# Patient Record
Sex: FEMALE | Race: OTHER | NOT HISPANIC OR LATINO
[De-identification: names, ages, dates, MRNs, and addresses within clinical notes are randomized per-mention and may not be internally consistent; named-entity substitution may affect disease eponyms.]

---

## 2017-12-18 ENCOUNTER — APPOINTMENT (OUTPATIENT)
Dept: ORTHOPEDIC SURGERY | Facility: CLINIC | Age: 82
End: 2017-12-18
Payer: MEDICARE

## 2017-12-18 VITALS
BODY MASS INDEX: 37.56 KG/M2 | SYSTOLIC BLOOD PRESSURE: 134 MMHG | WEIGHT: 220 LBS | DIASTOLIC BLOOD PRESSURE: 75 MMHG | HEART RATE: 89 BPM | HEIGHT: 64 IN

## 2017-12-18 DIAGNOSIS — Z78.9 OTHER SPECIFIED HEALTH STATUS: ICD-10-CM

## 2017-12-18 DIAGNOSIS — Z87.891 PERSONAL HISTORY OF NICOTINE DEPENDENCE: ICD-10-CM

## 2017-12-18 DIAGNOSIS — Z87.39 PERSONAL HISTORY OF OTHER DISEASES OF THE MUSCULOSKELETAL SYSTEM AND CONNECTIVE TISSUE: ICD-10-CM

## 2017-12-18 PROCEDURE — 99204 OFFICE O/P NEW MOD 45 MIN: CPT

## 2017-12-18 PROCEDURE — 72110 X-RAY EXAM L-2 SPINE 4/>VWS: CPT

## 2017-12-18 RX ORDER — MIRABEGRON 50 MG/1
TABLET, FILM COATED, EXTENDED RELEASE ORAL
Refills: 0 | Status: ACTIVE | COMMUNITY

## 2019-06-03 ENCOUNTER — APPOINTMENT (OUTPATIENT)
Dept: CARDIOLOGY | Facility: CLINIC | Age: 84
End: 2019-06-03
Payer: MEDICARE

## 2019-06-03 ENCOUNTER — LABORATORY RESULT (OUTPATIENT)
Age: 84
End: 2019-06-03

## 2019-06-03 ENCOUNTER — NON-APPOINTMENT (OUTPATIENT)
Age: 84
End: 2019-06-03

## 2019-06-03 VITALS
DIASTOLIC BLOOD PRESSURE: 60 MMHG | WEIGHT: 227.9 LBS | TEMPERATURE: 98.1 F | HEART RATE: 71 BPM | SYSTOLIC BLOOD PRESSURE: 120 MMHG | BODY MASS INDEX: 38.91 KG/M2 | HEIGHT: 64 IN | OXYGEN SATURATION: 94 %

## 2019-06-03 DIAGNOSIS — I89.0 LYMPHEDEMA, NOT ELSEWHERE CLASSIFIED: ICD-10-CM

## 2019-06-03 PROCEDURE — 99213 OFFICE O/P EST LOW 20 MIN: CPT

## 2019-06-03 PROCEDURE — 93306 TTE W/DOPPLER COMPLETE: CPT

## 2019-06-03 PROCEDURE — 93000 ELECTROCARDIOGRAM COMPLETE: CPT

## 2019-06-03 RX ORDER — DICLOFENAC POTASSIUM 50 MG/1
50 TABLET, COATED ORAL
Qty: 30 | Refills: 0 | Status: ACTIVE | COMMUNITY
Start: 2018-12-28

## 2019-06-03 RX ORDER — MELOXICAM 7.5 MG/1
7.5 TABLET ORAL
Qty: 60 | Refills: 0 | Status: ACTIVE | COMMUNITY
Start: 2019-04-29

## 2019-06-03 RX ORDER — TRAMADOL HYDROCHLORIDE 50 MG/1
50 TABLET, COATED ORAL
Qty: 120 | Refills: 0 | Status: ACTIVE | COMMUNITY
Start: 2018-12-20

## 2019-06-03 RX ORDER — NITROFURANTOIN (MONOHYDRATE/MACROCRYSTALS) 25; 75 MG/1; MG/1
100 CAPSULE ORAL
Qty: 14 | Refills: 0 | Status: DISCONTINUED | COMMUNITY
Start: 2018-12-20 | End: 2019-06-03

## 2019-06-03 NOTE — PHYSICAL EXAM
[No Acute Distress] : no acute distress [Well Nourished] : well nourished [Well Developed] : well developed [Well-Appearing] : well-appearing [Normal Sclera/Conjunctiva] : normal sclera/conjunctiva [EOMI] : extraocular movements intact [PERRL] : pupils equal round and reactive to light [Normal Outer Ear/Nose] : the outer ears and nose were normal in appearance [Normal Oropharynx] : the oropharynx was normal [Supple] : supple [No JVD] : no jugular venous distention [No Lymphadenopathy] : no lymphadenopathy [Thyroid Normal, No Nodules] : the thyroid was normal and there were no nodules present [No Respiratory Distress] : no respiratory distress  [Clear to Auscultation] : lungs were clear to auscultation bilaterally [No Accessory Muscle Use] : no accessory muscle use [Normal Rate] : normal rate  [Regular Rhythm] : with a regular rhythm [Normal S1, S2] : normal S1 and S2 [No Murmur] : no murmur heard [No Carotid Bruits] : no carotid bruits [No Abdominal Bruit] : a ~M bruit was not heard ~T in the abdomen [No Varicosities] : no varicosities [Pedal Pulses Present] : the pedal pulses are present [No Edema] : there was no peripheral edema [No Extremity Clubbing/Cyanosis] : no extremity clubbing/cyanosis [No Palpable Aorta] : no palpable aorta [Soft] : abdomen soft [Non Tender] : non-tender [Non-distended] : non-distended [No Masses] : no abdominal mass palpated [No HSM] : no HSM [Normal Bowel Sounds] : normal bowel sounds [Normal Posterior Cervical Nodes] : no posterior cervical lymphadenopathy [Normal Anterior Cervical Nodes] : no anterior cervical lymphadenopathy [No CVA Tenderness] : no CVA  tenderness [No Spinal Tenderness] : no spinal tenderness [No Joint Swelling] : no joint swelling [Grossly Normal Strength/Tone] : grossly normal strength/tone [No Rash] : no rash [Normal Gait] : normal gait [Coordination Grossly Intact] : coordination grossly intact [No Focal Deficits] : no focal deficits [Deep Tendon Reflexes (DTR)] : deep tendon reflexes were 2+ and symmetric [Normal Affect] : the affect was normal [Normal Insight/Judgement] : insight and judgment were intact [de-identified] : lymphedema bilateral left greater then right

## 2019-06-03 NOTE — HISTORY OF PRESENT ILLNESS
[FreeTextEntry1] : physical  [de-identified] : pt presents for yearly physical pt with hyperlipidemia .pt concerned about lymphedema .pt with ocassional fatigue pt denies any chest  pain dizziness ,lightheadedness ,nausea vomiting diaphoresis\par

## 2019-06-08 LAB
25(OH)D3 SERPL-MCNC: 39.9 NG/ML
ALBUMIN SERPL ELPH-MCNC: 4.8 G/DL
ALP BLD-CCNC: 78 U/L
ALT SERPL-CCNC: 14 U/L
ANION GAP SERPL CALC-SCNC: 10 MMOL/L
APPEARANCE: CLEAR
AST SERPL-CCNC: 16 U/L
BACTERIA: NEGATIVE
BASOPHILS # BLD AUTO: 0.03 K/UL
BASOPHILS NFR BLD AUTO: 0.4 %
BILIRUB SERPL-MCNC: 0.6 MG/DL
BILIRUBIN URINE: NEGATIVE
BLOOD URINE: NEGATIVE
BUN SERPL-MCNC: 32 MG/DL
CALCIUM SERPL-MCNC: 10.5 MG/DL
CHLORIDE SERPL-SCNC: 101 MMOL/L
CHOLEST SERPL-MCNC: 226 MG/DL
CHOLEST/HDLC SERPL: 2.7 RATIO
CK SERPL-CCNC: 45 U/L
CO2 SERPL-SCNC: 29 MMOL/L
COLOR: YELLOW
CREAT SERPL-MCNC: 0.72 MG/DL
EOSINOPHIL # BLD AUTO: 0.16 K/UL
EOSINOPHIL NFR BLD AUTO: 2.4 %
ESTIMATED AVERAGE GLUCOSE: 126 MG/DL
FERRITIN SERPL-MCNC: 47 NG/ML
FOLATE SERPL-MCNC: >20 NG/ML
GLUCOSE QUALITATIVE U: NEGATIVE
GLUCOSE SERPL-MCNC: 113 MG/DL
HAPTOGLOB SERPL-MCNC: 149 MG/DL
HBA1C MFR BLD HPLC: 6 %
HCT VFR BLD CALC: 43.6 %
HDLC SERPL-MCNC: 85 MG/DL
HGB BLD-MCNC: 14 G/DL
HYALINE CASTS: 0 /LPF
IMM GRANULOCYTES NFR BLD AUTO: 0.3 %
IRON SERPL-MCNC: 57 UG/DL
KETONES URINE: NEGATIVE
LDH SERPL-CCNC: 153 U/L
LDLC SERPL CALC-MCNC: 114 MG/DL
LDLC SERPL DIRECT ASSAY-MCNC: 132 MG/DL
LEUKOCYTE ESTERASE URINE: ABNORMAL
LYMPHOCYTES # BLD AUTO: 1.17 K/UL
LYMPHOCYTES NFR BLD AUTO: 17.2 %
MAN DIFF?: NORMAL
MCHC RBC-ENTMCNC: 31.1 PG
MCHC RBC-ENTMCNC: 32.1 GM/DL
MCV RBC AUTO: 96.9 FL
MICROSCOPIC-UA: NORMAL
MONOCYTES # BLD AUTO: 0.5 K/UL
MONOCYTES NFR BLD AUTO: 7.4 %
NEUTROPHILS # BLD AUTO: 4.91 K/UL
NEUTROPHILS NFR BLD AUTO: 72.3 %
NITRITE URINE: NEGATIVE
PH URINE: 6.5
PLATELET # BLD AUTO: 168 K/UL
POTASSIUM SERPL-SCNC: 5.2 MMOL/L
PROT SERPL-MCNC: 7.2 G/DL
PROTEIN URINE: NEGATIVE
RBC # BLD: 4.5 M/UL
RBC # FLD: 13 %
RED BLOOD CELLS URINE: 2 /HPF
SODIUM SERPL-SCNC: 140 MMOL/L
SPECIFIC GRAVITY URINE: 1.02
SQUAMOUS EPITHELIAL CELLS: 2 /HPF
T4 FREE SERPL-MCNC: 1.2 NG/DL
TRANSFERRIN SERPL-MCNC: 243 MG/DL
TRIGL SERPL-MCNC: 133 MG/DL
TSH SERPL-ACNC: 3.63 UIU/ML
UROBILINOGEN URINE: NORMAL
VIT B12 SERPL-MCNC: 454 PG/ML
WBC # FLD AUTO: 6.79 K/UL
WHITE BLOOD CELLS URINE: 1 /HPF

## 2019-06-20 ENCOUNTER — APPOINTMENT (OUTPATIENT)
Dept: VASCULAR SURGERY | Facility: CLINIC | Age: 84
End: 2019-06-20
Payer: MEDICARE

## 2019-06-20 VITALS
HEART RATE: 86 BPM | BODY MASS INDEX: 37.56 KG/M2 | HEIGHT: 64 IN | DIASTOLIC BLOOD PRESSURE: 67 MMHG | TEMPERATURE: 98.8 F | SYSTOLIC BLOOD PRESSURE: 106 MMHG | WEIGHT: 220 LBS

## 2019-06-20 VITALS — HEART RATE: 85 BPM | DIASTOLIC BLOOD PRESSURE: 72 MMHG | SYSTOLIC BLOOD PRESSURE: 108 MMHG

## 2019-06-20 DIAGNOSIS — I83.893 VARICOSE VEINS OF BILATERAL LOWER EXTREMITIES WITH OTHER COMPLICATIONS: ICD-10-CM

## 2019-06-20 PROCEDURE — 99203 OFFICE O/P NEW LOW 30 MIN: CPT

## 2019-06-20 PROCEDURE — 93970 EXTREMITY STUDY: CPT

## 2019-06-21 PROBLEM — I83.893 SYMPTOMATIC VARICOSE VEINS OF BOTH LOWER EXTREMITIES: Status: ACTIVE | Noted: 2019-06-21

## 2019-08-09 ENCOUNTER — APPOINTMENT (OUTPATIENT)
Dept: VASCULAR SURGERY | Facility: CLINIC | Age: 84
End: 2019-08-09

## 2019-08-12 ENCOUNTER — APPOINTMENT (OUTPATIENT)
Dept: VASCULAR SURGERY | Facility: CLINIC | Age: 84
End: 2019-08-12

## 2019-08-23 ENCOUNTER — APPOINTMENT (OUTPATIENT)
Dept: VASCULAR SURGERY | Facility: CLINIC | Age: 84
End: 2019-08-23

## 2019-08-26 ENCOUNTER — APPOINTMENT (OUTPATIENT)
Dept: VASCULAR SURGERY | Facility: CLINIC | Age: 84
End: 2019-08-26

## 2019-09-26 ENCOUNTER — NON-APPOINTMENT (OUTPATIENT)
Age: 84
End: 2019-09-26

## 2019-09-26 ENCOUNTER — APPOINTMENT (OUTPATIENT)
Dept: CARDIOLOGY | Facility: CLINIC | Age: 84
End: 2019-09-26
Payer: MEDICARE

## 2019-09-26 VITALS
SYSTOLIC BLOOD PRESSURE: 120 MMHG | WEIGHT: 228 LBS | TEMPERATURE: 97.6 F | OXYGEN SATURATION: 94 % | DIASTOLIC BLOOD PRESSURE: 60 MMHG | BODY MASS INDEX: 38.93 KG/M2 | HEIGHT: 64 IN | HEART RATE: 83 BPM

## 2019-09-26 DIAGNOSIS — R26.89 OTHER ABNORMALITIES OF GAIT AND MOBILITY: ICD-10-CM

## 2019-09-26 PROCEDURE — 93000 ELECTROCARDIOGRAM COMPLETE: CPT

## 2019-09-26 PROCEDURE — 99214 OFFICE O/P EST MOD 30 MIN: CPT

## 2019-09-26 RX ORDER — TAPENTADOL HYDROCHLORIDE 50 MG/1
50 TABLET, FILM COATED ORAL
Refills: 0 | Status: DISCONTINUED | COMMUNITY
End: 2019-09-26

## 2019-09-26 NOTE — HISTORY OF PRESENT ILLNESS
[FreeTextEntry1] : This is an 84 year old lady with a PMH of HLD, Hypothyroidism, Lymphedema, and Chronic Back Pain presents today for follow up. Patient with generalized joint pain and pain in the left thigh. Patient has been following with pain Management Dr. Sanches, Patient has been following with a Vascular doctor re: Lymphedema (Dr. Hay). Patient states that her mobility has worsened, and her balance has worsened. Patient states that she does not sleep well at night, and sometimes feel dizzy when she wakes up in the AM as she does not sleep well at night. Patient denies dyspnea, palpitations, chest pain, nausea, and vomiting.

## 2019-09-26 NOTE — REVIEW OF SYSTEMS
[Feeling Fatigued] : feeling fatigued [Joint Pain] : joint pain [Lower Back Pain] : lower back pain [Mid Back Pain] : mid back pain [Negative] : Heme/Lymph [Headache] : no headache [Fever] : no fever [Recent Weight Gain (___ Lbs)] : no recent weight gain [Chills] : no chills [Joint Swelling] : no joint swelling [Recent Weight Loss (___ Lbs)] : no recent weight loss [Joint Stiffness] : no joint stiffness [Muscle Cramps] : no muscle cramps [Limb Weakness (Paresis)] : no limb weakness

## 2019-09-26 NOTE — PHYSICAL EXAM
[General Appearance - Well Developed] : well developed [Well Groomed] : well groomed [Normal Appearance] : normal appearance [General Appearance - Well Nourished] : well nourished [No Deformities] : no deformities [General Appearance - In No Acute Distress] : no acute distress [Eyelids - No Xanthelasma] : the eyelids demonstrated no xanthelasmas [Normal Conjunctiva] : the conjunctiva exhibited no abnormalities [Normal Oral Mucosa] : normal oral mucosa [No Oral Pallor] : no oral pallor [No Oral Cyanosis] : no oral cyanosis [Normal Jugular Venous A Waves Present] : normal jugular venous A waves present [Normal Jugular Venous V Waves Present] : normal jugular venous V waves present [No Jugular Venous Smith A Waves] : no jugular venous smith A waves [Respiration, Rhythm And Depth] : normal respiratory rhythm and effort [Auscultation Breath Sounds / Voice Sounds] : lungs were clear to auscultation bilaterally [Exaggerated Use Of Accessory Muscles For Inspiration] : no accessory muscle use [Heart Sounds] : normal S1 and S2 [Heart Rate And Rhythm] : heart rate and rhythm were normal [Abdomen Soft] : soft [Murmurs] : no murmurs present [Abdomen Tenderness] : non-tender [Abdomen Mass (___ Cm)] : no abdominal mass palpated [Abnormal Walk] : normal gait [Gait - Sufficient For Exercise Testing] : the gait was sufficient for exercise testing [Nail Clubbing] : no clubbing of the fingernails [Cyanosis, Localized] : no localized cyanosis [Petechial Hemorrhages (___cm)] : no petechial hemorrhages [] : no rash [No Venous Stasis] : no venous stasis [Skin Color & Pigmentation] : normal skin color and pigmentation [Skin Lesions] : no skin lesions [No Xanthoma] : no  xanthoma was observed [No Skin Ulcers] : no skin ulcer [Oriented To Time, Place, And Person] : oriented to person, place, and time [Affect] : the affect was normal [Mood] : the mood was normal [No Anxiety] : not feeling anxious [FreeTextEntry1] : Lymphedema noted

## 2019-09-26 NOTE — REASON FOR VISIT
[Follow-Up - Clinic] : a clinic follow-up of [Hyperlipidemia] : hyperlipidemia [Hypertension] : hypertension [FreeTextEntry1] : Lymphedema, Chronic Back Pain

## 2019-09-28 LAB
25(OH)D3 SERPL-MCNC: 43.8 NG/ML
ALBUMIN SERPL ELPH-MCNC: 4.8 G/DL
ALP BLD-CCNC: 90 U/L
ALT SERPL-CCNC: 17 U/L
ANION GAP SERPL CALC-SCNC: 18 MMOL/L
APPEARANCE: CLEAR
AST SERPL-CCNC: 17 U/L
BACTERIA: NEGATIVE
BASOPHILS # BLD AUTO: 0.02 K/UL
BASOPHILS NFR BLD AUTO: 0.3 %
BILIRUB DIRECT SERPL-MCNC: 0.2 MG/DL
BILIRUB INDIRECT SERPL-MCNC: 0.7 MG/DL
BILIRUB SERPL-MCNC: 0.9 MG/DL
BILIRUBIN URINE: NEGATIVE
BLOOD URINE: NEGATIVE
BUN SERPL-MCNC: 27 MG/DL
CALCIUM SERPL-MCNC: 10.7 MG/DL
CHLORIDE SERPL-SCNC: 99 MMOL/L
CHOLEST SERPL-MCNC: 209 MG/DL
CHOLEST/HDLC SERPL: 2.3 RATIO
CO2 SERPL-SCNC: 22 MMOL/L
COLOR: YELLOW
CREAT SERPL-MCNC: 0.7 MG/DL
EOSINOPHIL # BLD AUTO: 0.11 K/UL
EOSINOPHIL NFR BLD AUTO: 1.6 %
ESTIMATED AVERAGE GLUCOSE: 126 MG/DL
GLUCOSE QUALITATIVE U: NEGATIVE
GLUCOSE SERPL-MCNC: 113 MG/DL
HBA1C MFR BLD HPLC: 6 %
HCT VFR BLD CALC: 46 %
HDLC SERPL-MCNC: 93 MG/DL
HGB BLD-MCNC: 14.3 G/DL
HYALINE CASTS: 3 /LPF
IMM GRANULOCYTES NFR BLD AUTO: 0.3 %
KETONES URINE: NEGATIVE
LDLC SERPL CALC-MCNC: 82 MG/DL
LEUKOCYTE ESTERASE URINE: NEGATIVE
LYMPHOCYTES # BLD AUTO: 1.14 K/UL
LYMPHOCYTES NFR BLD AUTO: 17 %
MAN DIFF?: NORMAL
MCHC RBC-ENTMCNC: 30.4 PG
MCHC RBC-ENTMCNC: 31.1 GM/DL
MCV RBC AUTO: 97.7 FL
MICROSCOPIC-UA: NORMAL
MONOCYTES # BLD AUTO: 0.45 K/UL
MONOCYTES NFR BLD AUTO: 6.7 %
NEUTROPHILS # BLD AUTO: 4.98 K/UL
NEUTROPHILS NFR BLD AUTO: 74.1 %
NITRITE URINE: NEGATIVE
PH URINE: 7
PLATELET # BLD AUTO: 182 K/UL
POTASSIUM SERPL-SCNC: 4.9 MMOL/L
PROT SERPL-MCNC: 7.2 G/DL
PROTEIN URINE: NORMAL
RBC # BLD: 4.71 M/UL
RBC # FLD: 13.2 %
RED BLOOD CELLS URINE: 2 /HPF
SODIUM SERPL-SCNC: 139 MMOL/L
SPECIFIC GRAVITY URINE: 1.02
SQUAMOUS EPITHELIAL CELLS: 2 /HPF
T4 FREE SERPL-MCNC: 1.2 NG/DL
TRIGL SERPL-MCNC: 172 MG/DL
TSH SERPL-ACNC: 3.64 UIU/ML
UROBILINOGEN URINE: NORMAL
WBC # FLD AUTO: 6.72 K/UL
WHITE BLOOD CELLS URINE: 1 /HPF

## 2019-10-04 ENCOUNTER — APPOINTMENT (OUTPATIENT)
Dept: VASCULAR SURGERY | Facility: CLINIC | Age: 84
End: 2019-10-04

## 2019-10-10 ENCOUNTER — MEDICATION RENEWAL (OUTPATIENT)
Age: 84
End: 2019-10-10

## 2019-10-13 LAB
ANION GAP SERPL CALC-SCNC: 13 MMOL/L
BUN SERPL-MCNC: 27 MG/DL
CALCIUM SERPL-MCNC: 10.1 MG/DL
CHLORIDE SERPL-SCNC: 101 MMOL/L
CO2 SERPL-SCNC: 24 MMOL/L
CREAT SERPL-MCNC: 0.82 MG/DL
GLUCOSE SERPL-MCNC: 119 MG/DL
POTASSIUM SERPL-SCNC: 5.5 MMOL/L
SODIUM SERPL-SCNC: 138 MMOL/L

## 2019-10-14 DIAGNOSIS — E87.5 HYPERKALEMIA: ICD-10-CM

## 2019-10-15 PROBLEM — E87.5 HYPERKALEMIA: Status: ACTIVE | Noted: 2019-10-15

## 2019-10-21 LAB
ALBUMIN MFR SERPL ELPH: 61.8 %
ALBUMIN SERPL-MCNC: 4.2 G/DL
ALBUMIN/GLOB SERPL: 1.6 RATIO
ALPHA1 GLOB MFR SERPL ELPH: 4 %
ALPHA1 GLOB SERPL ELPH-MCNC: 0.3 G/DL
ALPHA2 GLOB MFR SERPL ELPH: 10.6 %
ALPHA2 GLOB SERPL ELPH-MCNC: 0.7 G/DL
ANION GAP SERPL CALC-SCNC: 14 MMOL/L
B-GLOBULIN MFR SERPL ELPH: 10.7 %
B-GLOBULIN SERPL ELPH-MCNC: 0.7 G/DL
BUN SERPL-MCNC: 34 MG/DL
CALCIUM SERPL-MCNC: 10.6 MG/DL
CALCIUM SERPL-MCNC: 10.6 MG/DL
CHLORIDE SERPL-SCNC: 100 MMOL/L
CO2 SERPL-SCNC: 25 MMOL/L
CREAT SERPL-MCNC: 0.67 MG/DL
GAMMA GLOB FLD ELPH-MCNC: 0.9 G/DL
GAMMA GLOB MFR SERPL ELPH: 12.9 %
GLUCOSE SERPL-MCNC: 102 MG/DL
INTERPRETATION SERPL IEP-IMP: NORMAL
M PROTEIN SPEC IFE-MCNC: NORMAL
PARATHYROID HORMONE INTACT: 40 PG/ML
POTASSIUM SERPL-SCNC: 5.1 MMOL/L
PROT SERPL-MCNC: 6.8 G/DL
PROT SERPL-MCNC: 6.8 G/DL
SODIUM SERPL-SCNC: 139 MMOL/L

## 2019-10-28 ENCOUNTER — APPOINTMENT (OUTPATIENT)
Dept: ENDOCRINOLOGY | Facility: CLINIC | Age: 84
End: 2019-10-28
Payer: MEDICARE

## 2019-10-28 VITALS
OXYGEN SATURATION: 95 % | SYSTOLIC BLOOD PRESSURE: 118 MMHG | WEIGHT: 228 LBS | BODY MASS INDEX: 38.93 KG/M2 | HEIGHT: 64 IN | DIASTOLIC BLOOD PRESSURE: 80 MMHG | HEART RATE: 83 BPM

## 2019-10-28 DIAGNOSIS — R53.83 OTHER FATIGUE: ICD-10-CM

## 2019-10-28 PROCEDURE — 36415 COLL VENOUS BLD VENIPUNCTURE: CPT

## 2019-10-28 PROCEDURE — 99204 OFFICE O/P NEW MOD 45 MIN: CPT | Mod: 25

## 2019-10-28 RX ORDER — MULTIVIT-MIN/FOLIC/VIT K/LYCOP 400-300MCG
25 MCG TABLET ORAL DAILY
Qty: 90 | Refills: 0 | Status: ACTIVE | COMMUNITY
Start: 2019-10-28

## 2019-10-28 NOTE — HISTORY OF PRESENT ILLNESS
[FreeTextEntry1] : Ms. SURESH NGUYEN   is a 84 year  year old  female present today courtesy po PCP dr Avila with regard to a history of hypothyroidism.   She is too here for recent  elevated levels  calcium of 10.6 mg/dl, done by PCP Dr Avila . Patient is currently taking Levothyroxine 88  mcg daily.  Hypothyroidism has been present over 20 years.  Patient's daughter has history of thyroid disease.  She  has been compliant in taking the LT4 daily, away from food or any medication that may inhibit absorption. She  has tolerated this medication well. Without any apparent adverse effects. She denies any temperature intolerance, significant weight changes. She  in addition denies any palpitations, tremors, anxiousness, change in bowel habits or significant change in moods. Patient has lymphedema  to LE due to three spinal surgery and insomnia due to  comorbidities. \par Additional medical history includes gerd along with hyperlipidemia  and Hypercalcemia.  \par \par Patient had  nuclear implant in back for pain  which caused loss of bladder control.  Nuclear implant was later removed. Patient self catheterize 4x/day. Has loss weight on Optavia , loss 50 lbs over several months.  Patient has gained several pounds . Patient has tingling and numbness  from back to left thigh. \par rt Tibia fracture in 1984  due to fall on snow and fracture tibia and fibula. Required surery  Too, 2014 ldft ankle fx when fell out of wheel chair\par \par She does state that she was on fosomax for a number of years but ahs too been off for years but caused reflux.

## 2019-11-13 LAB
24R-OH-CALCIDIOL SERPL-MCNC: 37.4 PG/ML
ACE BLD-CCNC: 52 U/L
CALCIUM ?TM UR-MCNC: 18.7 MG/DL
CALCIUM SERPL-MCNC: 10.2 MG/DL
CALCIUM/CREAT UR: 0.3 RATIO
CREAT SPEC-SCNC: 56 MG/DL
PARATHYROID HORMONE INTACT: 42 PG/ML
PTH RELATED PROT SERPL-MCNC: <2 PMOL/L

## 2020-01-27 ENCOUNTER — NON-APPOINTMENT (OUTPATIENT)
Age: 85
End: 2020-01-27

## 2020-01-27 ENCOUNTER — APPOINTMENT (OUTPATIENT)
Dept: CARDIOLOGY | Facility: CLINIC | Age: 85
End: 2020-01-27
Payer: MEDICARE

## 2020-01-27 VITALS
BODY MASS INDEX: 39.14 KG/M2 | WEIGHT: 228 LBS | TEMPERATURE: 97.8 F | OXYGEN SATURATION: 95 % | HEART RATE: 78 BPM | DIASTOLIC BLOOD PRESSURE: 80 MMHG | SYSTOLIC BLOOD PRESSURE: 150 MMHG

## 2020-01-27 DIAGNOSIS — R42 DIZZINESS AND GIDDINESS: ICD-10-CM

## 2020-01-27 PROCEDURE — 93000 ELECTROCARDIOGRAM COMPLETE: CPT

## 2020-01-27 PROCEDURE — 99214 OFFICE O/P EST MOD 30 MIN: CPT

## 2020-01-27 NOTE — PHYSICAL EXAM
[General Appearance - Well Developed] : well developed [Normal Appearance] : normal appearance [Well Groomed] : well groomed [General Appearance - Well Nourished] : well nourished [No Deformities] : no deformities [General Appearance - In No Acute Distress] : no acute distress [Normal Conjunctiva] : the conjunctiva exhibited no abnormalities [Eyelids - No Xanthelasma] : the eyelids demonstrated no xanthelasmas [Normal Oral Mucosa] : normal oral mucosa [No Oral Pallor] : no oral pallor [No Oral Cyanosis] : no oral cyanosis [Normal Jugular Venous A Waves Present] : normal jugular venous A waves present [Normal Jugular Venous V Waves Present] : normal jugular venous V waves present [No Jugular Venous Smith A Waves] : no jugular venous smith A waves [Respiration, Rhythm And Depth] : normal respiratory rhythm and effort [Exaggerated Use Of Accessory Muscles For Inspiration] : no accessory muscle use [Auscultation Breath Sounds / Voice Sounds] : lungs were clear to auscultation bilaterally [Heart Rate And Rhythm] : heart rate and rhythm were normal [Heart Sounds] : normal S1 and S2 [Murmurs] : no murmurs present [Abdomen Soft] : soft [Abdomen Tenderness] : non-tender [Abdomen Mass (___ Cm)] : no abdominal mass palpated [Abnormal Walk] : normal gait [Gait - Sufficient For Exercise Testing] : the gait was sufficient for exercise testing [Nail Clubbing] : no clubbing of the fingernails [Cyanosis, Localized] : no localized cyanosis [Petechial Hemorrhages (___cm)] : no petechial hemorrhages [Skin Color & Pigmentation] : normal skin color and pigmentation [] : no rash [No Venous Stasis] : no venous stasis [Skin Lesions] : no skin lesions [No Skin Ulcers] : no skin ulcer [No Xanthoma] : no  xanthoma was observed [Oriented To Time, Place, And Person] : oriented to person, place, and time [Affect] : the affect was normal [Mood] : the mood was normal [No Anxiety] : not feeling anxious

## 2020-01-27 NOTE — HISTORY OF PRESENT ILLNESS
[FreeTextEntry1] : Shahrzad is an 85yo female who presents for routine follow-up. Patient has PMH of HLD, HTN, and hypothyroidism. Patient reports that she has been experiencing lightheadedness for the past few days. patient reports the lightheadedness occurs even at rest. Patient denies chest pain, shortness of breath or palpitations. Pt also with lymphedema of lower extremities.

## 2020-01-30 ENCOUNTER — NON-APPOINTMENT (OUTPATIENT)
Age: 85
End: 2020-01-30

## 2020-02-03 PROCEDURE — 93224 XTRNL ECG REC UP TO 48 HRS: CPT

## 2020-02-24 ENCOUNTER — APPOINTMENT (OUTPATIENT)
Dept: ENDOCRINOLOGY | Facility: CLINIC | Age: 85
End: 2020-02-24
Payer: MEDICARE

## 2020-02-24 ENCOUNTER — APPOINTMENT (OUTPATIENT)
Dept: CARDIOLOGY | Facility: CLINIC | Age: 85
End: 2020-02-24
Payer: MEDICARE

## 2020-02-24 VITALS
WEIGHT: 228 LBS | BODY MASS INDEX: 38.93 KG/M2 | DIASTOLIC BLOOD PRESSURE: 70 MMHG | OXYGEN SATURATION: 94 % | HEART RATE: 84 BPM | SYSTOLIC BLOOD PRESSURE: 130 MMHG | HEIGHT: 64 IN

## 2020-02-24 VITALS
TEMPERATURE: 97.8 F | SYSTOLIC BLOOD PRESSURE: 130 MMHG | HEART RATE: 81 BPM | DIASTOLIC BLOOD PRESSURE: 60 MMHG | HEIGHT: 64 IN | OXYGEN SATURATION: 97 % | BODY MASS INDEX: 38.93 KG/M2 | WEIGHT: 228 LBS

## 2020-02-24 DIAGNOSIS — R42 DIZZINESS AND GIDDINESS: ICD-10-CM

## 2020-02-24 PROCEDURE — 99214 OFFICE O/P EST MOD 30 MIN: CPT

## 2020-02-24 PROCEDURE — 99213 OFFICE O/P EST LOW 20 MIN: CPT

## 2020-02-24 PROCEDURE — 96372 THER/PROPH/DIAG INJ SC/IM: CPT

## 2020-02-24 NOTE — PHYSICAL EXAM
[General Appearance - Well Developed] : well developed [Normal Appearance] : normal appearance [Well Groomed] : well groomed [No Deformities] : no deformities [General Appearance - Well Nourished] : well nourished [General Appearance - In No Acute Distress] : no acute distress [Eyelids - No Xanthelasma] : the eyelids demonstrated no xanthelasmas [Normal Conjunctiva] : the conjunctiva exhibited no abnormalities [Normal Oral Mucosa] : normal oral mucosa [No Oral Pallor] : no oral pallor [No Oral Cyanosis] : no oral cyanosis [Normal Jugular Venous V Waves Present] : normal jugular venous V waves present [Normal Jugular Venous A Waves Present] : normal jugular venous A waves present [No Jugular Venous Smith A Waves] : no jugular venous smith A waves [Respiration, Rhythm And Depth] : normal respiratory rhythm and effort [Exaggerated Use Of Accessory Muscles For Inspiration] : no accessory muscle use [Auscultation Breath Sounds / Voice Sounds] : lungs were clear to auscultation bilaterally [Heart Rate And Rhythm] : heart rate and rhythm were normal [Heart Sounds] : normal S1 and S2 [Murmurs] : no murmurs present [Abdomen Soft] : soft [Abdomen Tenderness] : non-tender [Abdomen Mass (___ Cm)] : no abdominal mass palpated [Nail Clubbing] : no clubbing of the fingernails [Petechial Hemorrhages (___cm)] : no petechial hemorrhages [Cyanosis, Localized] : no localized cyanosis [Skin Color & Pigmentation] : normal skin color and pigmentation [] : no rash [Skin Lesions] : no skin lesions [No Skin Ulcers] : no skin ulcer [No Venous Stasis] : no venous stasis [No Xanthoma] : no  xanthoma was observed [Affect] : the affect was normal [Oriented To Time, Place, And Person] : oriented to person, place, and time [No Anxiety] : not feeling anxious [Mood] : the mood was normal [FreeTextEntry1] : reproducible pain left back area

## 2020-02-24 NOTE — HISTORY OF PRESENT ILLNESS
[FreeTextEntry1] : pt presents for f/u .pt with no furthur dizziness .pt with persistent back pain and leg pain seeing specialist pt denies any chest  pain dizziness ,lightheadedness ,nausea vomiting diaphoresis\par

## 2020-03-15 LAB
25(OH)D3 SERPL-MCNC: 53.6 NG/ML
ALBUMIN SERPL ELPH-MCNC: 5 G/DL
ALP BLD-CCNC: 82 U/L
ALT SERPL-CCNC: 17 U/L
ANION GAP SERPL CALC-SCNC: 14 MMOL/L
APPEARANCE: CLEAR
AST SERPL-CCNC: 19 U/L
BACTERIA: NEGATIVE
BASOPHILS # BLD AUTO: 0.03 K/UL
BASOPHILS NFR BLD AUTO: 0.4 %
BILIRUB DIRECT SERPL-MCNC: 0.2 MG/DL
BILIRUB INDIRECT SERPL-MCNC: 0.6 MG/DL
BILIRUB SERPL-MCNC: 0.8 MG/DL
BILIRUBIN URINE: NEGATIVE
BLOOD URINE: NEGATIVE
BUN SERPL-MCNC: 31 MG/DL
CALCIUM SERPL-MCNC: 10.2 MG/DL
CHLORIDE SERPL-SCNC: 100 MMOL/L
CHOLEST SERPL-MCNC: 224 MG/DL
CHOLEST/HDLC SERPL: 2.3 RATIO
CK SERPL-CCNC: 43 U/L
CO2 SERPL-SCNC: 25 MMOL/L
COLOR: NORMAL
CREAT SERPL-MCNC: 0.68 MG/DL
EOSINOPHIL # BLD AUTO: 0.1 K/UL
EOSINOPHIL NFR BLD AUTO: 1.5 %
ESTIMATED AVERAGE GLUCOSE: 123 MG/DL
GLUCOSE QUALITATIVE U: NEGATIVE
GLUCOSE SERPL-MCNC: 130 MG/DL
HBA1C MFR BLD HPLC: 5.9 %
HCT VFR BLD CALC: 45.7 %
HDLC SERPL-MCNC: 98 MG/DL
HGB BLD-MCNC: 14.3 G/DL
HYALINE CASTS: 0 /LPF
IMM GRANULOCYTES NFR BLD AUTO: 0.3 %
KETONES URINE: NEGATIVE
LDLC SERPL CALC-MCNC: 98 MG/DL
LDLC SERPL DIRECT ASSAY-MCNC: 108 MG/DL
LEUKOCYTE ESTERASE URINE: NEGATIVE
LYMPHOCYTES # BLD AUTO: 1.2 K/UL
LYMPHOCYTES NFR BLD AUTO: 18 %
MAN DIFF?: NORMAL
MCHC RBC-ENTMCNC: 30.9 PG
MCHC RBC-ENTMCNC: 31.3 GM/DL
MCV RBC AUTO: 98.7 FL
MICROSCOPIC-UA: NORMAL
MONOCYTES # BLD AUTO: 0.49 K/UL
MONOCYTES NFR BLD AUTO: 7.3 %
NEUTROPHILS # BLD AUTO: 4.84 K/UL
NEUTROPHILS NFR BLD AUTO: 72.5 %
NITRITE URINE: NEGATIVE
PH URINE: 7.5
PLATELET # BLD AUTO: 176 K/UL
POTASSIUM SERPL-SCNC: 5.2 MMOL/L
PROT SERPL-MCNC: 7.2 G/DL
PROTEIN URINE: NEGATIVE
RBC # BLD: 4.63 M/UL
RBC # FLD: 14 %
RED BLOOD CELLS URINE: 1 /HPF
SODIUM SERPL-SCNC: 139 MMOL/L
SPECIFIC GRAVITY URINE: 1.02
SQUAMOUS EPITHELIAL CELLS: 1 /HPF
T4 FREE SERPL-MCNC: 1.1 NG/DL
TRIGL SERPL-MCNC: 142 MG/DL
TSH SERPL-ACNC: 3.51 UIU/ML
UROBILINOGEN URINE: NORMAL
WBC # FLD AUTO: 6.68 K/UL
WHITE BLOOD CELLS URINE: 0 /HPF

## 2020-05-09 ENCOUNTER — RX RENEWAL (OUTPATIENT)
Age: 85
End: 2020-05-09

## 2020-06-05 ENCOUNTER — NON-APPOINTMENT (OUTPATIENT)
Age: 85
End: 2020-06-05

## 2020-06-05 ENCOUNTER — LABORATORY RESULT (OUTPATIENT)
Age: 85
End: 2020-06-05

## 2020-06-05 ENCOUNTER — APPOINTMENT (OUTPATIENT)
Dept: PULMONOLOGY | Facility: CLINIC | Age: 85
End: 2020-06-05
Payer: MEDICARE

## 2020-06-05 ENCOUNTER — APPOINTMENT (OUTPATIENT)
Dept: CARDIOLOGY | Facility: CLINIC | Age: 85
End: 2020-06-05
Payer: MEDICARE

## 2020-06-05 VITALS
SYSTOLIC BLOOD PRESSURE: 120 MMHG | HEIGHT: 64 IN | DIASTOLIC BLOOD PRESSURE: 70 MMHG | OXYGEN SATURATION: 97 % | HEART RATE: 85 BPM | TEMPERATURE: 98.6 F

## 2020-06-05 PROCEDURE — 71046 X-RAY EXAM CHEST 2 VIEWS: CPT

## 2020-06-05 PROCEDURE — 99214 OFFICE O/P EST MOD 30 MIN: CPT

## 2020-06-05 PROCEDURE — 93000 ELECTROCARDIOGRAM COMPLETE: CPT

## 2020-06-05 NOTE — HISTORY OF PRESENT ILLNESS
[FreeTextEntry1] : Pt. is a 85 y.o female with a PMHx of HLD, hypothyroid, lumbar radiculopathy, lymphedema s/p spinal sx, osteoarthritis, vitamin d deficiency, here for f/u. Pt. reports that she still has back pain, occuring from posterior thoracic region to lumbar, did not try gabapentin because she said doesn't agree with her. Tried APAP, Tramadol, taking medical THC with minimal effect. Sees Dr. Ramon Sanches [phone] [facility] for pain management. Pt. also c/o of SOB, thru out the day, occurs during activity and laying down, needs to stop activity, unable to walking 2 blocks. Still has SOB when at home w/o wearing mask, no anxiety, no hx of asthma, copd, smoking. Denies chest pain, palpitations, PND, syncope, claudication, fever, chills, anorexia, nausea, vomiting, diarrhea, constipation, acid reflux and any recent infection. Pt. request IgG COVID19 testing.\par

## 2020-06-05 NOTE — PHYSICAL EXAM
[General Appearance - Well Developed] : well developed [Normal Appearance] : normal appearance [Well Groomed] : well groomed [General Appearance - Well Nourished] : well nourished [No Deformities] : no deformities [General Appearance - In No Acute Distress] : no acute distress [Normal Conjunctiva] : the conjunctiva exhibited no abnormalities [Eyelids - No Xanthelasma] : the eyelids demonstrated no xanthelasmas [Normal Oral Mucosa] : normal oral mucosa [Normal Jugular Venous A Waves Present] : normal jugular venous A waves present [No Oral Pallor] : no oral pallor [No Oral Cyanosis] : no oral cyanosis [Normal Jugular Venous V Waves Present] : normal jugular venous V waves present [No Jugular Venous Smith A Waves] : no jugular venous smith A waves [Respiration, Rhythm And Depth] : normal respiratory rhythm and effort [Auscultation Breath Sounds / Voice Sounds] : lungs were clear to auscultation bilaterally [Exaggerated Use Of Accessory Muscles For Inspiration] : no accessory muscle use [Heart Rate And Rhythm] : heart rate and rhythm were normal [Murmurs] : no murmurs present [Heart Sounds] : normal S1 and S2 [Abdomen Tenderness] : non-tender [Abdomen Soft] : soft [FreeTextEntry1] : reproducible pain left back area  [Nail Clubbing] : no clubbing of the fingernails [Abdomen Mass (___ Cm)] : no abdominal mass palpated [Cyanosis, Localized] : no localized cyanosis [Petechial Hemorrhages (___cm)] : no petechial hemorrhages [Skin Color & Pigmentation] : normal skin color and pigmentation [] : no rash [No Venous Stasis] : no venous stasis [Skin Lesions] : no skin lesions [No Skin Ulcers] : no skin ulcer [No Xanthoma] : no  xanthoma was observed [Oriented To Time, Place, And Person] : oriented to person, place, and time [Mood] : the mood was normal [Affect] : the affect was normal [No Anxiety] : not feeling anxious

## 2020-06-08 ENCOUNTER — APPOINTMENT (OUTPATIENT)
Dept: CARDIOLOGY | Facility: CLINIC | Age: 85
End: 2020-06-08
Payer: MEDICARE

## 2020-06-08 PROCEDURE — 78452 HT MUSCLE IMAGE SPECT MULT: CPT

## 2020-06-08 PROCEDURE — A9500: CPT

## 2020-06-08 PROCEDURE — 93306 TTE W/DOPPLER COMPLETE: CPT

## 2020-06-08 PROCEDURE — 93970 EXTREMITY STUDY: CPT

## 2020-06-08 PROCEDURE — 93015 CV STRESS TEST SUPVJ I&R: CPT

## 2020-06-08 RX ORDER — REGADENOSON 0.08 MG/ML
0.4 INJECTION, SOLUTION INTRAVENOUS
Qty: 1 | Refills: 0 | Status: COMPLETED | OUTPATIENT
Start: 2020-06-08

## 2020-06-08 RX ADMIN — REGADENOSON 0 MG/5ML: 0.08 INJECTION, SOLUTION INTRAVENOUS at 00:00

## 2020-06-14 LAB
25(OH)D3 SERPL-MCNC: 51.3 NG/ML
ALBUMIN SERPL ELPH-MCNC: 4.4 G/DL
ALP BLD-CCNC: 67 U/L
ALT SERPL-CCNC: 19 U/L
ANION GAP SERPL CALC-SCNC: 13 MMOL/L
APPEARANCE: CLEAR
AST SERPL-CCNC: 19 U/L
BACTERIA: ABNORMAL
BASOPHILS # BLD AUTO: 0.03 K/UL
BASOPHILS NFR BLD AUTO: 0.4 %
BILIRUB SERPL-MCNC: 0.9 MG/DL
BILIRUBIN URINE: NEGATIVE
BLOOD URINE: NEGATIVE
BUN SERPL-MCNC: 26 MG/DL
CALCIUM SERPL-MCNC: 10.5 MG/DL
CHLORIDE SERPL-SCNC: 101 MMOL/L
CHOLEST SERPL-MCNC: 257 MG/DL
CHOLEST/HDLC SERPL: 3 RATIO
CK SERPL-CCNC: 47 U/L
CO2 SERPL-SCNC: 24 MMOL/L
COLOR: YELLOW
CREAT SERPL-MCNC: 0.73 MG/DL
CREAT SPEC-SCNC: 61 MG/DL
EOSINOPHIL # BLD AUTO: 0.1 K/UL
EOSINOPHIL NFR BLD AUTO: 1.4 %
ESTIMATED AVERAGE GLUCOSE: 128 MG/DL
GLUCOSE QUALITATIVE U: NEGATIVE
GLUCOSE SERPL-MCNC: 111 MG/DL
HBA1C MFR BLD HPLC: 6.1 %
HCT VFR BLD CALC: 45 %
HDLC SERPL-MCNC: 85 MG/DL
HGB BLD-MCNC: 14.1 G/DL
HYALINE CASTS: 1 /LPF
IMM GRANULOCYTES NFR BLD AUTO: 0.3 %
KETONES URINE: NEGATIVE
LDLC SERPL CALC-MCNC: 129 MG/DL
LEUKOCYTE ESTERASE URINE: ABNORMAL
LYMPHOCYTES # BLD AUTO: 1.09 K/UL
LYMPHOCYTES NFR BLD AUTO: 14.9 %
MAN DIFF?: NORMAL
MCHC RBC-ENTMCNC: 30.3 PG
MCHC RBC-ENTMCNC: 31.3 GM/DL
MCV RBC AUTO: 96.6 FL
MICROALBUMIN 24H UR DL<=1MG/L-MCNC: 2.8 MG/DL
MICROALBUMIN/CREAT 24H UR-RTO: 46 MG/G
MICROSCOPIC-UA: NORMAL
MONOCYTES # BLD AUTO: 0.5 K/UL
MONOCYTES NFR BLD AUTO: 6.8 %
NEUTROPHILS # BLD AUTO: 5.56 K/UL
NEUTROPHILS NFR BLD AUTO: 76.2 %
NITRITE URINE: NEGATIVE
NT-PROBNP SERPL-MCNC: 68 PG/ML
PH URINE: 7
PLATELET # BLD AUTO: 176 K/UL
POTASSIUM SERPL-SCNC: 5.3 MMOL/L
PROT SERPL-MCNC: 6.9 G/DL
PROTEIN URINE: NORMAL
RBC # BLD: 4.66 M/UL
RBC # FLD: 14.5 %
RED BLOOD CELLS URINE: 4 /HPF
SARS-COV-2 IGG SERPL IA-ACNC: <0.1 INDEX
SARS-COV-2 IGG SERPL QL IA: NEGATIVE
SODIUM SERPL-SCNC: 139 MMOL/L
SPECIFIC GRAVITY URINE: 1.02
SQUAMOUS EPITHELIAL CELLS: 1 /HPF
T4 FREE SERPL-MCNC: 1.1 NG/DL
TRIGL SERPL-MCNC: 214 MG/DL
TSH SERPL-ACNC: 2.1 UIU/ML
UROBILINOGEN URINE: NORMAL
WBC # FLD AUTO: 7.3 K/UL
WHITE BLOOD CELLS URINE: 58 /HPF

## 2020-06-15 DIAGNOSIS — Z78.9 OTHER SPECIFIED HEALTH STATUS: ICD-10-CM

## 2020-06-17 ENCOUNTER — APPOINTMENT (OUTPATIENT)
Dept: PULMONOLOGY | Facility: CLINIC | Age: 85
End: 2020-06-17
Payer: MEDICARE

## 2020-06-17 VITALS
HEIGHT: 64 IN | SYSTOLIC BLOOD PRESSURE: 123 MMHG | HEART RATE: 81 BPM | DIASTOLIC BLOOD PRESSURE: 73 MMHG | TEMPERATURE: 98.6 F | WEIGHT: 228 LBS | BODY MASS INDEX: 38.93 KG/M2 | OXYGEN SATURATION: 96 %

## 2020-06-17 DIAGNOSIS — Z20.828 CONTACT WITH AND (SUSPECTED) EXPOSURE TO OTHER VIRAL COMMUNICABLE DISEASES: ICD-10-CM

## 2020-06-17 PROCEDURE — 99205 OFFICE O/P NEW HI 60 MIN: CPT | Mod: CS

## 2020-06-17 NOTE — DISCUSSION/SUMMARY
[FreeTextEntry1] : Shortness of breath-dyspnea on exertion\par Rule out due to overweight with a component of deconditioning\par Former tobacco history-rule out COPD, emphysema\par History obstructive sleep apnea-has declined use of CPAP\par Rule out subtle interstitial lung disease\par Schedule chest CT scan noncontrast\par Order PFT body box if demonstrates obstructive pattern, COPD will address issue of bronchodilators lama agents inhaled steroids\par Office follow-up\par

## 2020-06-17 NOTE — PHYSICAL EXAM
[No Acute Distress] : no acute distress [Low Lying Soft Palate] : low lying soft palate [Normal Appearance] : normal appearance [III] : Mallampati Class: III [Supple] : supple [No JVD] : no jvd [Normal S1, S2] : normal s1, s2 [Normal Rate/Rhythm] : normal rate/rhythm [No Murmurs] : no murmurs [No Acc Muscle Use] : no acc muscle use [No Resp Distress] : no resp distress [Normal Rhythm and Effort] : normal rhythm and effort [Normal Palpation] : normal palpation [Clear to Auscultation Bilaterally] : clear to auscultation bilaterally [Normal to Percussion] : normal to percussion [Soft] : soft [Normal Bowel Sounds] : normal bowel sounds [No HSM] : no hsm [No Clubbing] : no clubbing [No Cyanosis] : no cyanosis [No Edema] : no edema [Normal Color/ Pigmentation] : normal color/ pigmentation [No Rash] : no rash [Oriented x3] : oriented x3 [Normal Mood] : normal mood [No Focal Deficits] : no focal deficits [Normal Affect] : normal affect [TextBox_2] : overweight

## 2020-06-17 NOTE — PROCEDURE
[FreeTextEntry1] : Chest x-ray completed June 5, 2020\par Clear lungs\par Kumar kyra in place\par Right costophrenic angle with blunting\par \par June 8, 2020\par Nuclear stress test negative\par Echocardiogram\par EF approximately 55 to 60%\par Diastolic dysfunction\par Mild LVH\par \par Additional data review COVID antibody negative\par

## 2020-06-17 NOTE — HISTORY OF PRESENT ILLNESS
[Former] : former [>= 30 pack years] : >= 30 pack years [TextBox_4] : Pulmonary evaluation\par Chief complaint progressive shortness of breath dyspnea on exertion\par Patient describes her shortness of breath is heavy breathing fatigue and feeling tired\par She does not describe associated wheeze willing to walk steps and stairs but that she states because of her lumbar back pain\par Prior pulmonary history.  Denies history of asthma pneumonia myocarditis pulmonary bowel disease tuberculosis\par Positive obstructive sleep apnea some years ago data not available did not tolerate CPAP\par No history of chemical toxic inhalational exposures\par Approximately 46-year pack history tobacco but discontinued smoking greater than 30 years\par  [TextBox_11] : 2 [TextBox_29] : 23-year pack history age of 18 through age 51 approximately 2 packs/day [TextBox_13] : 1986

## 2020-06-17 NOTE — REVIEW OF SYSTEMS
[Fatigue] : fatigue [Hay Fever] : hay fever [Frequency] : frequency [Memory Loss] : memory loss [Negative] : Neurologic [Fever] : no fever [Recent Wt Gain (___ Lbs)] : ~T no recent weight gain [Chills] : no chills [Poor Appetite] : no poor appetite [Chest Discomfort] : no chest discomfort [Orthopnea] : no orthopnea [Phlebitis] : no phlebitis [PND] : no PND [Syncope] : no syncope [GERD] : no gerd [Abdominal Pain] : no abdominal pain [Nausea] : no nausea [Diarrhea] : no diarrhea [Vomiting] : no vomiting [Dysphagia] : no dysphagia [Food Intolerance] : no food intolerance [Hepatic Disease] : no hepatic disease [Headache] : no headache [Focal Weakness] : no focal weakness [Seizures] : no seizures [Head Injury] : no head injury [Dizziness] : no dizziness [Numbness] : no numbness [Diabetes] : no diabetes [Thyroid Problem] : no thyroid problem [TextBox_30] : HPI [TextBox_44] : Chronic lymphedema [TextBox_94] : Lumbar radiculopathy with balance problems

## 2020-06-18 LAB
APPEARANCE: CLEAR
BACTERIA: ABNORMAL
BILIRUBIN URINE: NEGATIVE
BLOOD URINE: NEGATIVE
COLOR: NORMAL
GLUCOSE QUALITATIVE U: NEGATIVE
HYALINE CASTS: 2 /LPF
KETONES URINE: NEGATIVE
LEUKOCYTE ESTERASE URINE: ABNORMAL
MICROSCOPIC-UA: NORMAL
NITRITE URINE: POSITIVE
PH URINE: 7
PROTEIN URINE: NEGATIVE
RED BLOOD CELLS URINE: 1 /HPF
SPECIFIC GRAVITY URINE: 1.02
SQUAMOUS EPITHELIAL CELLS: 4 /HPF
UROBILINOGEN URINE: NORMAL
WHITE BLOOD CELLS URINE: 9 /HPF

## 2020-06-19 ENCOUNTER — APPOINTMENT (OUTPATIENT)
Dept: CARDIOLOGY | Facility: CLINIC | Age: 85
End: 2020-06-19
Payer: MEDICARE

## 2020-06-19 VITALS
HEIGHT: 64 IN | OXYGEN SATURATION: 96 % | TEMPERATURE: 98.7 F | HEART RATE: 77 BPM | WEIGHT: 228 LBS | BODY MASS INDEX: 38.93 KG/M2 | SYSTOLIC BLOOD PRESSURE: 126 MMHG | DIASTOLIC BLOOD PRESSURE: 60 MMHG

## 2020-06-19 DIAGNOSIS — G62.9 POLYNEUROPATHY, UNSPECIFIED: ICD-10-CM

## 2020-06-19 DIAGNOSIS — R82.81 PYURIA: ICD-10-CM

## 2020-06-19 PROCEDURE — 99213 OFFICE O/P EST LOW 20 MIN: CPT

## 2020-06-19 RX ORDER — DULOXETINE HYDROCHLORIDE 60 MG/1
60 CAPSULE, DELAYED RELEASE PELLETS ORAL
Qty: 30 | Refills: 0 | Status: DISCONTINUED | COMMUNITY
Start: 2018-11-21 | End: 2020-06-19

## 2020-06-19 NOTE — PHYSICAL EXAM
[Normal Appearance] : normal appearance [General Appearance - Well Developed] : well developed [Well Groomed] : well groomed [No Deformities] : no deformities [General Appearance - In No Acute Distress] : no acute distress [General Appearance - Well Nourished] : well nourished [Eyelids - No Xanthelasma] : the eyelids demonstrated no xanthelasmas [Normal Conjunctiva] : the conjunctiva exhibited no abnormalities [No Oral Pallor] : no oral pallor [Normal Oral Mucosa] : normal oral mucosa [No Oral Cyanosis] : no oral cyanosis [Normal Jugular Venous A Waves Present] : normal jugular venous A waves present [Normal Jugular Venous V Waves Present] : normal jugular venous V waves present [No Jugular Venous Smith A Waves] : no jugular venous smith A waves [Respiration, Rhythm And Depth] : normal respiratory rhythm and effort [Auscultation Breath Sounds / Voice Sounds] : lungs were clear to auscultation bilaterally [Exaggerated Use Of Accessory Muscles For Inspiration] : no accessory muscle use [Heart Rate And Rhythm] : heart rate and rhythm were normal [Heart Sounds] : normal S1 and S2 [Abdomen Tenderness] : non-tender [Murmurs] : no murmurs present [Abdomen Soft] : soft [Abdomen Mass (___ Cm)] : no abdominal mass palpated [Gait - Sufficient For Exercise Testing] : the gait was sufficient for exercise testing [Abnormal Walk] : normal gait [Petechial Hemorrhages (___cm)] : no petechial hemorrhages [Nail Clubbing] : no clubbing of the fingernails [Cyanosis, Localized] : no localized cyanosis [Skin Color & Pigmentation] : normal skin color and pigmentation [] : no rash [No Venous Stasis] : no venous stasis [No Skin Ulcers] : no skin ulcer [Skin Lesions] : no skin lesions [Oriented To Time, Place, And Person] : oriented to person, place, and time [No Xanthoma] : no  xanthoma was observed [Mood] : the mood was normal [Affect] : the affect was normal [No Anxiety] : not feeling anxious

## 2020-06-19 NOTE — HISTORY OF PRESENT ILLNESS
[FreeTextEntry1] : Pt. is a 85 y.o female with a PMHx of HLD, hypothyroid, lumbar radiculopathy, lymphedema s/p spinal sx, osteoarthritis, vitamin d deficiency, here for f/u. Patient was previously seen in office on  6/5 and ordered for a series of tests for work-up of dyspnea. She also followed up with Dr. Mary who ordered patient for CT chest but has not completed yet. Patient is here to review results of cardiac testing. Patient was also found to have UTI on recent u/a and culture today. Patient reports that she has noticed some improvement in her urinary symptoms.

## 2020-06-21 ENCOUNTER — OUTPATIENT (OUTPATIENT)
Dept: OUTPATIENT SERVICES | Facility: HOSPITAL | Age: 85
LOS: 1 days | End: 2020-06-21
Payer: MEDICARE

## 2020-06-21 ENCOUNTER — RESULT REVIEW (OUTPATIENT)
Age: 85
End: 2020-06-21

## 2020-06-21 ENCOUNTER — APPOINTMENT (OUTPATIENT)
Dept: CT IMAGING | Facility: IMAGING CENTER | Age: 85
End: 2020-06-21

## 2020-06-21 DIAGNOSIS — R06.02 SHORTNESS OF BREATH: ICD-10-CM

## 2020-06-21 LAB — BACTERIA UR CULT: ABNORMAL

## 2020-06-21 PROCEDURE — 71250 CT THORAX DX C-: CPT | Mod: 26

## 2020-06-21 PROCEDURE — 71250 CT THORAX DX C-: CPT

## 2020-06-25 DIAGNOSIS — Z01.818 ENCOUNTER FOR OTHER PREPROCEDURAL EXAMINATION: ICD-10-CM

## 2020-06-26 ENCOUNTER — APPOINTMENT (OUTPATIENT)
Dept: DISASTER EMERGENCY | Facility: CLINIC | Age: 85
End: 2020-06-26

## 2020-06-27 ENCOUNTER — APPOINTMENT (OUTPATIENT)
Dept: DISASTER EMERGENCY | Facility: CLINIC | Age: 85
End: 2020-06-27

## 2020-06-27 LAB — SARS-COV-2 N GENE NPH QL NAA+PROBE: NOT DETECTED

## 2020-06-29 ENCOUNTER — APPOINTMENT (OUTPATIENT)
Dept: PULMONOLOGY | Facility: CLINIC | Age: 85
End: 2020-06-29
Payer: MEDICARE

## 2020-06-29 VITALS
HEIGHT: 64 IN | WEIGHT: 225 LBS | SYSTOLIC BLOOD PRESSURE: 118 MMHG | HEART RATE: 91 BPM | DIASTOLIC BLOOD PRESSURE: 77 MMHG | TEMPERATURE: 98.7 F | RESPIRATION RATE: 16 BRPM | OXYGEN SATURATION: 95 % | BODY MASS INDEX: 38.41 KG/M2

## 2020-06-29 PROCEDURE — 94618 PULMONARY STRESS TESTING: CPT

## 2020-06-29 PROCEDURE — 94750: CPT

## 2020-06-29 PROCEDURE — 94729 DIFFUSING CAPACITY: CPT

## 2020-06-29 PROCEDURE — 94664 DEMO&/EVAL PT USE INHALER: CPT | Mod: 59

## 2020-06-29 PROCEDURE — 94726 PLETHYSMOGRAPHY LUNG VOLUMES: CPT

## 2020-06-29 PROCEDURE — 94060 EVALUATION OF WHEEZING: CPT

## 2020-06-29 PROCEDURE — 99214 OFFICE O/P EST MOD 30 MIN: CPT | Mod: 25

## 2020-06-29 RX ORDER — TRAMADOL HYDROCHLORIDE AND ACETAMINOPHEN 37.5; 325 MG/1; MG/1
37.5-325 TABLET, FILM COATED ORAL
Qty: 63 | Refills: 0 | Status: ACTIVE | COMMUNITY
Start: 2020-05-21

## 2020-06-29 NOTE — PHYSICAL EXAM
[No Acute Distress] : no acute distress [Low Lying Soft Palate] : low lying soft palate [III] : Mallampati Class: III [No JVD] : no jvd [Supple] : supple [Normal Appearance] : normal appearance [Normal S1, S2] : normal s1, s2 [Normal Rate/Rhythm] : normal rate/rhythm [No Resp Distress] : no resp distress [No Murmurs] : no murmurs [No Acc Muscle Use] : no acc muscle use [Normal Palpation] : normal palpation [Normal Rhythm and Effort] : normal rhythm and effort [Clear to Auscultation Bilaterally] : clear to auscultation bilaterally [Soft] : soft [Normal to Percussion] : normal to percussion [No Clubbing] : no clubbing [Normal Bowel Sounds] : normal bowel sounds [No HSM] : no hsm [No Edema] : no edema [No Cyanosis] : no cyanosis [Normal Color/ Pigmentation] : normal color/ pigmentation [No Rash] : no rash [No Focal Deficits] : no focal deficits [Normal Mood] : normal mood [Oriented x3] : oriented x3 [Normal Affect] : normal affect [TextBox_2] : overweight

## 2020-06-29 NOTE — REVIEW OF SYSTEMS
[Fatigue] : fatigue [Hay Fever] : hay fever [Frequency] : frequency [Memory Loss] : memory loss [Negative] : Neurologic [Recent Wt Gain (___ Lbs)] : ~T no recent weight gain [Chills] : no chills [Fever] : no fever [Orthopnea] : no orthopnea [Chest Discomfort] : no chest discomfort [Poor Appetite] : no poor appetite [PND] : no PND [Phlebitis] : no phlebitis [Syncope] : no syncope [GERD] : no gerd [Abdominal Pain] : no abdominal pain [Vomiting] : no vomiting [Diarrhea] : no diarrhea [Nausea] : no nausea [Dysphagia] : no dysphagia [Food Intolerance] : no food intolerance [Hepatic Disease] : no hepatic disease [Focal Weakness] : no focal weakness [Headache] : no headache [Seizures] : no seizures [Head Injury] : no head injury [Numbness] : no numbness [Dizziness] : no dizziness [Thyroid Problem] : no thyroid problem [Diabetes] : no diabetes [TextBox_30] : HPI [TextBox_94] : Lumbar radiculopathy with balance problems [TextBox_44] : Chronic lymphedema

## 2020-06-29 NOTE — PROCEDURE
[FreeTextEntry1] : PFT June 29, 2020\par Normal spirometry\par No bronchodilator response at FEV1\par Normal lung volumes with total lung capacity 105% predicted.\par Resistance and specific conductance normal\par Diffusion normal 82% of predicted.\par Hemoglobin 14.1\par \par CT chest June 21, 2020\par 2 mm left upper lobe calcified granuloma\par Negative pleural effusions\par Hilum and mediastinum normal without adenopathy\par Atherosclerotic changes aorta coronary arteries\par No pericardial effusion\par Clear lungs\par No evidence of pneumonia\par \par Pulmonary 6-minute walk June 29, 2020\par Normal study\par Baseline room air O2 saturations 95 to 96%\par No desaturation on room air\par \par Chest x-ray completed June 5, 2020\par Clear lungs\par Kumar kyra in place\par Right costophrenic angle with blunting\par \par June 8, 2020\par Nuclear stress test negative\par Echocardiogram\par EF approximately 55 to 60%\par Diastolic dysfunction\par Mild LVH\par \par Additional data review COVID antibody negative\par \par

## 2020-06-29 NOTE — HISTORY OF PRESENT ILLNESS
[Former] : former [>= 30 pack years] : >= 30 pack years [TextBox_4] : Pulmonary evaluation\par Chief complaint progressive shortness of breath dyspnea on exertion\par Patient describes her shortness of breath is heavy breathing fatigue and feeling tired\par She does not describe associated wheeze willing to walk steps and stairs but that she states because of her lumbar back pain\par Prior pulmonary history.  Denies history of asthma pneumonia myocarditis pulmonary bowel disease tuberculosis\par Positive obstructive sleep apnea some years ago data not available did not tolerate CPAP\par No history of chemical toxic inhalational exposures\par Approximately 46-year pack history tobacco but discontinued smoking greater than 30 years\par  [TextBox_11] : 2 [TextBox_13] : 1986 [TextBox_29] : 23-year pack history age of 18 through age 51 approximately 2 packs/day

## 2020-07-23 ENCOUNTER — LABORATORY RESULT (OUTPATIENT)
Age: 85
End: 2020-07-23

## 2020-07-26 LAB
APPEARANCE: CLEAR
BACTERIA UR CULT: ABNORMAL
BILIRUBIN URINE: NEGATIVE
BLOOD URINE: NEGATIVE
COLOR: NORMAL
GLUCOSE QUALITATIVE U: NEGATIVE
KETONES URINE: NEGATIVE
LEUKOCYTE ESTERASE URINE: ABNORMAL
NITRITE URINE: POSITIVE
PH URINE: 6.5
PROTEIN URINE: NEGATIVE
SPECIFIC GRAVITY URINE: 1.01
UROBILINOGEN URINE: NORMAL

## 2020-07-26 RX ORDER — CEFDINIR 300 MG/1
300 CAPSULE ORAL TWICE DAILY
Qty: 14 | Refills: 0 | Status: ACTIVE | COMMUNITY
Start: 2020-06-18 | End: 1900-01-01

## 2020-07-31 ENCOUNTER — APPOINTMENT (OUTPATIENT)
Dept: CARDIOLOGY | Facility: CLINIC | Age: 85
End: 2020-07-31
Payer: MEDICARE

## 2020-07-31 VITALS
SYSTOLIC BLOOD PRESSURE: 130 MMHG | DIASTOLIC BLOOD PRESSURE: 70 MMHG | HEIGHT: 64 IN | HEART RATE: 84 BPM | OXYGEN SATURATION: 94 %

## 2020-07-31 DIAGNOSIS — R41.3 OTHER AMNESIA: ICD-10-CM

## 2020-07-31 PROCEDURE — 99213 OFFICE O/P EST LOW 20 MIN: CPT

## 2020-07-31 NOTE — PHYSICAL EXAM
[General Appearance - Well Developed] : well developed [Well Groomed] : well groomed [General Appearance - Well Nourished] : well nourished [Normal Appearance] : normal appearance [General Appearance - In No Acute Distress] : no acute distress [No Deformities] : no deformities [Normal Conjunctiva] : the conjunctiva exhibited no abnormalities [Eyelids - No Xanthelasma] : the eyelids demonstrated no xanthelasmas [Normal Oral Mucosa] : normal oral mucosa [No Oral Pallor] : no oral pallor [No Oral Cyanosis] : no oral cyanosis [Normal Jugular Venous A Waves Present] : normal jugular venous A waves present [Normal Jugular Venous V Waves Present] : normal jugular venous V waves present [No Jugular Venous Smith A Waves] : no jugular venous smith A waves [Exaggerated Use Of Accessory Muscles For Inspiration] : no accessory muscle use [Respiration, Rhythm And Depth] : normal respiratory rhythm and effort [Auscultation Breath Sounds / Voice Sounds] : lungs were clear to auscultation bilaterally [Heart Rate And Rhythm] : heart rate and rhythm were normal [Heart Sounds] : normal S1 and S2 [Murmurs] : no murmurs present [Abdomen Tenderness] : non-tender [Abdomen Soft] : soft [Abdomen Mass (___ Cm)] : no abdominal mass palpated [Gait - Sufficient For Exercise Testing] : the gait was sufficient for exercise testing [Abnormal Walk] : normal gait [Nail Clubbing] : no clubbing of the fingernails [Cyanosis, Localized] : no localized cyanosis [Petechial Hemorrhages (___cm)] : no petechial hemorrhages [] : no rash [Skin Color & Pigmentation] : normal skin color and pigmentation [Skin Lesions] : no skin lesions [No Venous Stasis] : no venous stasis [No Xanthoma] : no  xanthoma was observed [No Skin Ulcers] : no skin ulcer [Oriented To Time, Place, And Person] : oriented to person, place, and time [Affect] : the affect was normal [No Anxiety] : not feeling anxious [Mood] : the mood was normal

## 2020-07-31 NOTE — HISTORY OF PRESENT ILLNESS
[FreeTextEntry1] : Pt. is a 85 y.o female with a PMHx of HLD, hypothyroid, lumbar radiculopathy, lymphedema s/p spinal sx, osteoarthritis, vitamin d deficiency, here for f/u. Patient was previously seen in office for dyspnea, she had cardiac w/u done which was negative, she had CT chest done last month which was wnl. Pt reports her dyspnea is intermittent, unchanged in the last few months. She also reports memory loss, states she has difficulty remembering people's names and short term things. She has no other additional issues or concerns for today. pt with improved dysuria

## 2020-08-29 LAB
APPEARANCE: CLEAR
BACTERIA: NEGATIVE
BILIRUBIN URINE: NEGATIVE
BLOOD URINE: NEGATIVE
COLOR: YELLOW
GLUCOSE QUALITATIVE U: NEGATIVE
HYALINE CASTS: 2 /LPF
KETONES URINE: NEGATIVE
LEUKOCYTE ESTERASE URINE: NEGATIVE
MICROSCOPIC-UA: NORMAL
NITRITE URINE: NEGATIVE
PH URINE: 6.5
PROTEIN URINE: NORMAL
RED BLOOD CELLS URINE: 2 /HPF
SPECIFIC GRAVITY URINE: 1.03
SQUAMOUS EPITHELIAL CELLS: 7 /HPF
UROBILINOGEN URINE: NORMAL
WHITE BLOOD CELLS URINE: 3 /HPF

## 2020-08-31 NOTE — HISTORY OF PRESENT ILLNESS
[FreeTextEntry1] : Ms. SURESH NGUYEN   is a 84 year  year old female mili returns for endocrine  follow up  with regard to a history of hypothyroidism.   She too returns with regard to mild hypercalcemia Patient is currently taking Levothyroxine 88  mcg daily.  Hypothyroidism has been present over 20 years.  .  She  has been compliant in taking the LT4 daily, away from food or any medication that may inhibit absorption. She  has tolerated this medication well. Without any apparent adverse effects. She denies any temperature intolerance, significant weight changes. She  in addition denies any palpitations, tremors, anxiousness, change in bowel habits or significant change in moods. Patient has lymphedema  to LE due to three spinal surgery and insomnia due to  comorbidities. \par Additional medical history includes gerd along with hyperlipidemia   Is oN d3 1,000 iu daily for vitamin d deficiency.\par On med marijuana capsules per Dr. Sanches.\par \par Hx tibnial fx  Lowest t scvodsre -2.5   risk faor fall\par \par willige PRolia

## 2020-09-01 ENCOUNTER — APPOINTMENT (OUTPATIENT)
Dept: PULMONOLOGY | Facility: CLINIC | Age: 85
End: 2020-09-01

## 2020-09-17 ENCOUNTER — APPOINTMENT (OUTPATIENT)
Dept: ENDOCRINOLOGY | Facility: CLINIC | Age: 85
End: 2020-09-17
Payer: MEDICARE

## 2020-09-17 VITALS
BODY MASS INDEX: 37.22 KG/M2 | HEART RATE: 85 BPM | HEIGHT: 64 IN | OXYGEN SATURATION: 96 % | SYSTOLIC BLOOD PRESSURE: 118 MMHG | DIASTOLIC BLOOD PRESSURE: 82 MMHG | WEIGHT: 218 LBS | RESPIRATION RATE: 16 BRPM

## 2020-09-17 PROCEDURE — 96372 THER/PROPH/DIAG INJ SC/IM: CPT

## 2020-09-17 RX ORDER — DENOSUMAB 60 MG/ML
60 INJECTION SUBCUTANEOUS
Qty: 1 | Refills: 0 | Status: COMPLETED | OUTPATIENT
Start: 2020-09-17

## 2020-09-17 RX ORDER — LEVOTHYROXINE SODIUM 0.09 MG/1
88 TABLET ORAL DAILY
Qty: 90 | Refills: 1 | Status: ACTIVE | COMMUNITY
Start: 2018-10-11 | End: 1900-01-01

## 2020-09-17 RX ADMIN — DENOSUMAB 0 MG/ML: 60 INJECTION SUBCUTANEOUS at 00:00

## 2020-09-17 NOTE — HISTORY OF PRESENT ILLNESS
[FreeTextEntry1] : Ms. SURESH NGUYEN   is a 85 year  year old female mili returns for endocrine  follow up  with regard to a history of hypothyroidism.   She too returns with regard to mild hypercalcemia Patient is currently taking Levothyroxine 88  mcg daily.  Hypothyroidism has been present over 20 years. She  has tolerated this medication well. Without any apparent adverse effects. She denies any temperature intolerance, significant weight changes. She  in addition denies any palpitations, tremors, anxiousness, change in bowel habits or significant change in moods. Patient has lymphedema  to LE due to three spinal surgery and insomnia due to  comorbidities. \par Additional medical history includes gerd along with hyperlipidemia   Is  taking  d3 1,000 iu daily for vitamin d deficiency.\par On med marijuana capsules per Dr. Sanches. Does help with pain left  thigh\par Hx tibnial fx  Lowest t scvodsre -2.5   risk faor fall Dex Nov 2019\par tingling rt habnd ? cts\par \par willige PRolia

## 2020-11-07 ENCOUNTER — RX RENEWAL (OUTPATIENT)
Age: 85
End: 2020-11-07

## 2020-12-01 ENCOUNTER — NON-APPOINTMENT (OUTPATIENT)
Age: 85
End: 2020-12-01

## 2020-12-01 ENCOUNTER — APPOINTMENT (OUTPATIENT)
Dept: CARDIOLOGY | Facility: CLINIC | Age: 85
End: 2020-12-01
Payer: MEDICARE

## 2020-12-01 VITALS
TEMPERATURE: 98.1 F | HEART RATE: 86 BPM | DIASTOLIC BLOOD PRESSURE: 70 MMHG | HEIGHT: 64 IN | OXYGEN SATURATION: 97 % | SYSTOLIC BLOOD PRESSURE: 146 MMHG

## 2020-12-01 DIAGNOSIS — N39.0 URINARY TRACT INFECTION, SITE NOT SPECIFIED: ICD-10-CM

## 2020-12-01 DIAGNOSIS — R06.02 SHORTNESS OF BREATH: ICD-10-CM

## 2020-12-01 DIAGNOSIS — Z71.89 OTHER SPECIFIED COUNSELING: ICD-10-CM

## 2020-12-01 DIAGNOSIS — M25.552 PAIN IN LEFT HIP: ICD-10-CM

## 2020-12-01 DIAGNOSIS — R45.89 OTHER SYMPTOMS AND SIGNS INVOLVING EMOTIONAL STATE: ICD-10-CM

## 2020-12-01 PROCEDURE — 99214 OFFICE O/P EST MOD 30 MIN: CPT

## 2020-12-01 PROCEDURE — 93000 ELECTROCARDIOGRAM COMPLETE: CPT

## 2020-12-01 NOTE — HISTORY OF PRESENT ILLNESS
[FreeTextEntry1] : This is a 85 year old lady presents today to follow up. Patient states that she was recently seen by the urologist- Dr. Zaragoza and was prescribed Keflex. Patient has baseline SOB, which has been worked up previously.  Patient with chronic hip pain, she states she has been told in the past that it is due to her previous spinal surgeries. Patient denies dyspnea, palpitations, chest pain, nausea, vomiting, dizziness and lightheadedness.\par

## 2020-12-01 NOTE — PHYSICAL EXAM
[General Appearance - Well Developed] : well developed [Normal Appearance] : normal appearance [Well Groomed] : well groomed [General Appearance - Well Nourished] : well nourished [No Deformities] : no deformities [General Appearance - In No Acute Distress] : no acute distress [Normal Conjunctiva] : the conjunctiva exhibited no abnormalities [Eyelids - No Xanthelasma] : the eyelids demonstrated no xanthelasmas [Normal Oral Mucosa] : normal oral mucosa [No Oral Pallor] : no oral pallor [No Oral Cyanosis] : no oral cyanosis [Normal Jugular Venous A Waves Present] : normal jugular venous A waves present [Normal Jugular Venous V Waves Present] : normal jugular venous V waves present [No Jugular Venous Smith A Waves] : no jugular venous smith A waves [Respiration, Rhythm And Depth] : normal respiratory rhythm and effort [Exaggerated Use Of Accessory Muscles For Inspiration] : no accessory muscle use [Auscultation Breath Sounds / Voice Sounds] : lungs were clear to auscultation bilaterally [Heart Rate And Rhythm] : heart rate and rhythm were normal [Heart Sounds] : normal S1 and S2 [Murmurs] : no murmurs present [Abdomen Soft] : soft [Abdomen Tenderness] : non-tender [Abdomen Mass (___ Cm)] : no abdominal mass palpated [Nail Clubbing] : no clubbing of the fingernails [Cyanosis, Localized] : no localized cyanosis [Petechial Hemorrhages (___cm)] : no petechial hemorrhages [Skin Color & Pigmentation] : normal skin color and pigmentation [] : no rash [No Venous Stasis] : no venous stasis [Skin Lesions] : no skin lesions [No Skin Ulcers] : no skin ulcer [No Xanthoma] : no  xanthoma was observed [Oriented To Time, Place, And Person] : oriented to person, place, and time [Affect] : the affect was normal [Mood] : the mood was normal [No Anxiety] : not feeling anxious [FreeTextEntry1] : Lymphedema noted left

## 2020-12-08 ENCOUNTER — APPOINTMENT (OUTPATIENT)
Dept: ORTHOPEDIC SURGERY | Facility: CLINIC | Age: 85
End: 2020-12-08
Payer: MEDICARE

## 2020-12-08 VITALS
BODY MASS INDEX: 37.56 KG/M2 | HEART RATE: 98 BPM | OXYGEN SATURATION: 95 % | HEIGHT: 64 IN | DIASTOLIC BLOOD PRESSURE: 76 MMHG | SYSTOLIC BLOOD PRESSURE: 170 MMHG | WEIGHT: 220 LBS

## 2020-12-08 DIAGNOSIS — Z98.1 ARTHRODESIS STATUS: ICD-10-CM

## 2020-12-08 PROCEDURE — 99215 OFFICE O/P EST HI 40 MIN: CPT

## 2020-12-08 PROCEDURE — 72170 X-RAY EXAM OF PELVIS: CPT

## 2020-12-08 PROCEDURE — 72100 X-RAY EXAM L-S SPINE 2/3 VWS: CPT

## 2020-12-08 NOTE — DISCUSSION/SUMMARY
[de-identified] : The patient was advised of her findings.  Her x-rays were discussed.  She remains under the care of a pain management specialist and will be following up with him this week.  She takes tramadol NSAIDs and medical marijuana.  She has previously undergone several series of spinal epidural injections.\par At this time the patient will be referred to Dr. Machado for further definitive management of her spine accordingly.

## 2020-12-08 NOTE — HISTORY OF PRESENT ILLNESS
[Worsening] : worsening [2] : an average pain level of 2/10 [0] : a minimum pain level of 0/10 [7] : a maximum pain level of 7/10 [Intermit.] : ~He/She~ states the symptoms seem to be intermittent [Walking] : worsened by walking [None] : No relieving factors are noted [___ mths] : [unfilled] month(s) ago [de-identified] : Pt presents for initial evaluation with pain in her left leg. the pain radiates from her left hip to the left toes with tingling.  Pt is taking tramadol, medical marijuana/ CBD, meloxicam.  Pt has lymph edema to the left leg. Pt had three spinal surgeries, hx degenerative disc. Pt is ambulating with a walker. Pt has to catherize  her bladder 4 times a day. Pt last saw her spinal doctors last seen approx 8 years ago. Dr Roger Sanches has seen pt  for epidural injection last year.

## 2020-12-08 NOTE — PHYSICAL EXAM
[de-identified] : Physical examination discloses a 85-year-old female ambulating with a walker and an obvious antalgic gait.  Well-healed midline incisions noted to the lumbar spine corresponding to her prior fusion surgery.  Significant limited flexion and lateral flexion of the lumbar spine.  Positive straight leg raise on the left side at 50 degrees.  Generalized muscle weakness to the left lower extremity on resisted hip flexors knee extensors and dorsiflexion of the left great toe.  Generalized sensory deficit to both lower extremities which the patient attributes to a chronic neuropathy. [de-identified] : X-rays taken of the lumbosacral spine and AP and lateral projections disclose spinal fusion instrumentation appearing relatively unchanged since 2017 with comparative x-rays.\par AP view of the pelvis does not reveal any acute deficits with respect to the hip joints.  Articular spaces fairly well-maintained bilaterally

## 2021-02-24 ENCOUNTER — NON-APPOINTMENT (OUTPATIENT)
Age: 86
End: 2021-02-24

## 2021-02-26 ENCOUNTER — APPOINTMENT (OUTPATIENT)
Dept: VASCULAR SURGERY | Facility: CLINIC | Age: 86
End: 2021-02-26
Payer: MEDICARE

## 2021-02-26 VITALS — DIASTOLIC BLOOD PRESSURE: 73 MMHG | HEART RATE: 82 BPM | SYSTOLIC BLOOD PRESSURE: 113 MMHG

## 2021-02-26 VITALS
TEMPERATURE: 96.8 F | HEIGHT: 64 IN | BODY MASS INDEX: 37.56 KG/M2 | DIASTOLIC BLOOD PRESSURE: 75 MMHG | SYSTOLIC BLOOD PRESSURE: 127 MMHG | WEIGHT: 220 LBS | HEART RATE: 81 BPM

## 2021-02-26 DIAGNOSIS — M54.16 RADICULOPATHY, LUMBAR REGION: ICD-10-CM

## 2021-02-26 DIAGNOSIS — I89.0 LYMPHEDEMA, NOT ELSEWHERE CLASSIFIED: ICD-10-CM

## 2021-02-26 PROCEDURE — 93971 EXTREMITY STUDY: CPT

## 2021-02-26 PROCEDURE — 99213 OFFICE O/P EST LOW 20 MIN: CPT

## 2021-02-27 PROBLEM — I89.0 LYMPHEDEMA: Status: ACTIVE | Noted: 2019-06-03

## 2021-02-27 PROBLEM — M54.16 LUMBAR RADICULOPATHY: Status: ACTIVE | Noted: 2017-12-18

## 2021-02-27 NOTE — REVIEW OF SYSTEMS
[Leg Claudication] : no intermittent leg claudication [Lower Ext Edema] : lower extremity edema [Shortness Of Breath] : no shortness of breath [Abdominal Pain] : no abdominal pain [Skin Wound] : no skin wound [Limb Weakness] : no limb weakness [Difficulty Walking] : no difficulty walking [Negative] : Constitutional [FreeTextEntry5] : chronic LE swelling [FreeTextEntry9] : LLE pain

## 2021-02-27 NOTE — PHYSICAL EXAM
[2+] : left 2+ [Ankle Swelling (On Exam)] : present [Ankle Swelling Bilaterally] : bilaterally  [Calm] : calm [de-identified] : well appearing female, NA D [de-identified] : unlabored [de-identified] : regular rate  [FreeTextEntry1] : Legs warm and well perfused\par Mild LE swelling [de-identified] : NT, ND [de-identified] : intact

## 2021-02-27 NOTE — HISTORY OF PRESENT ILLNESS
[FreeTextEntry1] : SURESH NGUYEN is a 85 year old female who presents today for LLE pain. PMHx significant for long standing lymphedema and spinal surgery. Patient describes a "drawing" pain originating above L buttock radiating down the posterior thigh and calf. In the past only had pain radiating down to the knee but as of the last week it has extended to the calf. Pain is worse at night while laying flat. Patient reports h/o LLE DVT 30-40 years ago (does not recall any inciting event) which the doctors "dissolved" - she does not remember how it was treated. Denies any recent travel or periods of immobility. Denies trauma or worsening swelling, claudication, rest pain, and wounds. Chronic LE edema being managed with [employer] lymphedema pump.\par \par \par VLE today is negative for DVT/SVT in the LLE.

## 2021-02-27 NOTE — ASSESSMENT
[FreeTextEntry1] : A/P: SURESH NGUYEN is a 85 year old female with h/o LLE DVT now with worsening LLE pain. No evidence of DVT on duplex. Pattern of pain likely radiculopathic - per allscripts, patient was evaluated by ortho Dr. Wade and advised to follow up with a Dr. Machado. Patient does not recall details of her last ortho visit. Advised her to contact her orthopedic specialist for further management. Otherwise no acute vascular issues active at this time. She should c/w the lymphedema pump. Vascular f/u as needed.

## 2021-03-18 ENCOUNTER — APPOINTMENT (OUTPATIENT)
Dept: ENDOCRINOLOGY | Facility: CLINIC | Age: 86
End: 2021-03-18
Payer: MEDICARE

## 2021-03-18 VITALS
DIASTOLIC BLOOD PRESSURE: 82 MMHG | BODY MASS INDEX: 37.56 KG/M2 | HEIGHT: 64 IN | HEART RATE: 85 BPM | TEMPERATURE: 98.2 F | OXYGEN SATURATION: 97 % | WEIGHT: 220 LBS | SYSTOLIC BLOOD PRESSURE: 138 MMHG

## 2021-03-18 DIAGNOSIS — K21.9 GASTRO-ESOPHAGEAL REFLUX DISEASE W/OUT ESOPHAGITIS: ICD-10-CM

## 2021-03-18 PROCEDURE — 96372 THER/PROPH/DIAG INJ SC/IM: CPT

## 2021-03-18 PROCEDURE — 99214 OFFICE O/P EST MOD 30 MIN: CPT | Mod: 25

## 2021-03-18 RX ORDER — GABAPENTIN 100 MG/1
100 CAPSULE ORAL
Qty: 90 | Refills: 1 | Status: DISCONTINUED | COMMUNITY
Start: 2020-02-24 | End: 2021-03-18

## 2021-03-18 RX ORDER — OMEPRAZOLE 20 MG/1
20 CAPSULE, DELAYED RELEASE ORAL
Qty: 90 | Refills: 0 | Status: ACTIVE | COMMUNITY
Start: 2018-07-23 | End: 1900-01-01

## 2021-03-18 RX ADMIN — DENOSUMAB 0 MG/ML: 60 INJECTION SUBCUTANEOUS at 00:00

## 2021-03-18 NOTE — HISTORY OF PRESENT ILLNESS
[FreeTextEntry1] : Ms. SURESH NGUYNE   is a 85 year   old female who returns for endocrine  follow up  with regard to a history of hypothyroidism.   She too returns with regard to mild hypercalcemia Patient is currently taking Levothyroxine 88  mcg daily.  Hypothyroidism has been present over 20 years. She  has tolerated this medication well. Without any apparent adverse effects. She denies any temperature intolerance, significant weight changes. She  in addition denies any palpitations, tremors, anxiousness, change in bowel habits or significant change in moods. Patient has lymphedema  to LE due to three spinal surgery and insomnia due to  comorbidities. \par Additional medical history includes gerd along with hyperlipidemia   Is  taking  d3 2,000 iu daily for vitamin d deficiency.\par On Tramadol per Dr. Sanches to help with pain left  thigh and degenerative spine. Had MRI last week but has not heard back yet. Complains of bilateral edema in the feet\par Hx tibial fx\par Lowest t score -2.5   froml Dexa Nov 2019\par A1c 6.1%\par Had both doses of COVID vaccine\par \par willige PRolia

## 2021-03-22 ENCOUNTER — APPOINTMENT (OUTPATIENT)
Dept: CARDIOLOGY | Facility: CLINIC | Age: 86
End: 2021-03-22
Payer: MEDICARE

## 2021-03-22 VITALS
DIASTOLIC BLOOD PRESSURE: 78 MMHG | TEMPERATURE: 99.1 F | HEIGHT: 64 IN | SYSTOLIC BLOOD PRESSURE: 152 MMHG | OXYGEN SATURATION: 95 % | HEART RATE: 85 BPM

## 2021-03-22 VITALS — SYSTOLIC BLOOD PRESSURE: 122 MMHG | DIASTOLIC BLOOD PRESSURE: 68 MMHG

## 2021-03-22 DIAGNOSIS — Z71.89 OTHER SPECIFIED COUNSELING: ICD-10-CM

## 2021-03-22 DIAGNOSIS — Z12.83 ENCOUNTER FOR SCREENING FOR MALIGNANT NEOPLASM OF SKIN: ICD-10-CM

## 2021-03-22 DIAGNOSIS — E83.52 HYPERCALCEMIA: ICD-10-CM

## 2021-03-22 DIAGNOSIS — Z13.228 ENCOUNTER FOR SCREENING FOR OTHER METABOLIC DISORDERS: ICD-10-CM

## 2021-03-22 DIAGNOSIS — M81.0 AGE-RELATED OSTEOPOROSIS W/OUT CURRENT PATHOLOGICAL FRACTURE: ICD-10-CM

## 2021-03-22 LAB
25(OH)D3 SERPL-MCNC: 65 NG/ML
ALBUMIN SERPL ELPH-MCNC: 4.3 G/DL
ALP BLD-CCNC: 74 U/L
ALT SERPL-CCNC: 17 U/L
ANION GAP SERPL CALC-SCNC: 10 MMOL/L
ANION GAP SERPL CALC-SCNC: 12 MMOL/L
APPEARANCE: CLEAR
AST SERPL-CCNC: 22 U/L
BACTERIA UR CULT: NORMAL
BACTERIA: NEGATIVE
BASOPHILS # BLD AUTO: 0.04 K/UL
BASOPHILS NFR BLD AUTO: 0.6 %
BILIRUB SERPL-MCNC: 0.9 MG/DL
BILIRUBIN URINE: NEGATIVE
BLOOD URINE: NEGATIVE
BUN SERPL-MCNC: 24 MG/DL
BUN SERPL-MCNC: 25 MG/DL
CALCIUM SERPL-MCNC: 10 MG/DL
CALCIUM SERPL-MCNC: 9.8 MG/DL
CHLORIDE SERPL-SCNC: 103 MMOL/L
CHLORIDE SERPL-SCNC: 104 MMOL/L
CHOLEST SERPL-MCNC: 252 MG/DL
CO2 SERPL-SCNC: 24 MMOL/L
CO2 SERPL-SCNC: 26 MMOL/L
COLOR: YELLOW
CREAT SERPL-MCNC: 0.71 MG/DL
CREAT SERPL-MCNC: 0.72 MG/DL
EOSINOPHIL # BLD AUTO: 0.21 K/UL
EOSINOPHIL NFR BLD AUTO: 3.2 %
ESTIMATED AVERAGE GLUCOSE: 128 MG/DL
GLUCOSE QUALITATIVE U: NEGATIVE
GLUCOSE SERPL-MCNC: 105 MG/DL
GLUCOSE SERPL-MCNC: 106 MG/DL
HBA1C MFR BLD HPLC: 6.1 %
HCT VFR BLD CALC: 42.8 %
HDLC SERPL-MCNC: 93 MG/DL
HGB BLD-MCNC: 13.4 G/DL
HYALINE CASTS: 2 /LPF
IMM GRANULOCYTES NFR BLD AUTO: 0.2 %
KETONES URINE: NEGATIVE
LDLC SERPL CALC-MCNC: 128 MG/DL
LEUKOCYTE ESTERASE URINE: NEGATIVE
LYMPHOCYTES # BLD AUTO: 1.31 K/UL
LYMPHOCYTES NFR BLD AUTO: 20 %
MAN DIFF?: NORMAL
MCHC RBC-ENTMCNC: 30.7 PG
MCHC RBC-ENTMCNC: 31.3 GM/DL
MCV RBC AUTO: 97.9 FL
MICROSCOPIC-UA: NORMAL
MONOCYTES # BLD AUTO: 0.42 K/UL
MONOCYTES NFR BLD AUTO: 6.4 %
NEUTROPHILS # BLD AUTO: 4.57 K/UL
NEUTROPHILS NFR BLD AUTO: 69.6 %
NITRITE URINE: NEGATIVE
NONHDLC SERPL-MCNC: 159 MG/DL
PH URINE: 7
PLATELET # BLD AUTO: 160 K/UL
POTASSIUM SERPL-SCNC: 4.4 MMOL/L
POTASSIUM SERPL-SCNC: 4.7 MMOL/L
PROT SERPL-MCNC: 6.9 G/DL
PROTEIN URINE: ABNORMAL
RBC # BLD: 4.37 M/UL
RBC # FLD: 14 %
RED BLOOD CELLS URINE: 4 /HPF
SODIUM SERPL-SCNC: 138 MMOL/L
SODIUM SERPL-SCNC: 140 MMOL/L
SPECIFIC GRAVITY URINE: 1.02
SQUAMOUS EPITHELIAL CELLS: 10 /HPF
T4 FREE SERPL-MCNC: 1.1 NG/DL
TRIGL SERPL-MCNC: 155 MG/DL
TSH SERPL-ACNC: 2.17 UIU/ML
UROBILINOGEN URINE: NORMAL
WBC # FLD AUTO: 6.56 K/UL
WHITE BLOOD CELLS URINE: 5 /HPF

## 2021-03-22 PROCEDURE — 99213 OFFICE O/P EST LOW 20 MIN: CPT

## 2021-03-22 RX ORDER — DENOSUMAB 60 MG/ML
60 INJECTION SUBCUTANEOUS
Qty: 1 | Refills: 0 | Status: COMPLETED | OUTPATIENT
Start: 2021-03-18

## 2021-03-22 RX ORDER — PYRIDOXINE HCL (VITAMIN B6) 50 MG
50 TABLET ORAL DAILY
Qty: 30 | Refills: 0 | Status: ACTIVE | COMMUNITY
Start: 2021-03-22 | End: 1900-01-01

## 2021-03-22 RX ORDER — CYANOCOBALAMIN/FOLIC AC/VIT B6 1-2.5-25MG
2.5-25-1 TABLET ORAL
Qty: 90 | Refills: 2 | Status: ACTIVE | COMMUNITY
Start: 2021-03-22 | End: 1900-01-01

## 2021-03-22 NOTE — HISTORY OF PRESENT ILLNESS
[FreeTextEntry1] : This is an 86 year old lady with a PMH of HLD, Pre-Dm, Hypercalcemia, Hypothyroidism, Chronic Back Pain and Vitamin D Deficiency presents today for follow up. Patient states that she is feeling good and has no complaints at this time. Patient denies dyspnea, palpitations, chest pain, nausea, vomiting, dizziness and lightheadedness.\par

## 2021-03-22 NOTE — PHYSICAL EXAM
[General Appearance - Well Developed] : well developed [Normal Appearance] : normal appearance [Well Groomed] : well groomed [General Appearance - Well Nourished] : well nourished [No Deformities] : no deformities [General Appearance - In No Acute Distress] : no acute distress [Normal Conjunctiva] : the conjunctiva exhibited no abnormalities [Eyelids - No Xanthelasma] : the eyelids demonstrated no xanthelasmas [Normal Oral Mucosa] : normal oral mucosa [No Oral Pallor] : no oral pallor [No Oral Cyanosis] : no oral cyanosis [Normal Jugular Venous A Waves Present] : normal jugular venous A waves present [Normal Jugular Venous V Waves Present] : normal jugular venous V waves present [No Jugular Venous Smith A Waves] : no jugular venous smith A waves [Respiration, Rhythm And Depth] : normal respiratory rhythm and effort [Exaggerated Use Of Accessory Muscles For Inspiration] : no accessory muscle use [Auscultation Breath Sounds / Voice Sounds] : lungs were clear to auscultation bilaterally [Heart Rate And Rhythm] : heart rate and rhythm were normal [Heart Sounds] : normal S1 and S2 [Murmurs] : no murmurs present [Abdomen Soft] : soft [Abdomen Tenderness] : non-tender [Abdomen Mass (___ Cm)] : no abdominal mass palpated [Nail Clubbing] : no clubbing of the fingernails [Cyanosis, Localized] : no localized cyanosis [Petechial Hemorrhages (___cm)] : no petechial hemorrhages [Skin Color & Pigmentation] : normal skin color and pigmentation [] : no rash [No Venous Stasis] : no venous stasis [Skin Lesions] : no skin lesions [No Skin Ulcers] : no skin ulcer [No Xanthoma] : no  xanthoma was observed [Oriented To Time, Place, And Person] : oriented to person, place, and time [Affect] : the affect was normal [Mood] : the mood was normal [No Anxiety] : not feeling anxious [FreeTextEntry1] : Lymphedema noted

## 2021-05-02 ENCOUNTER — RX RENEWAL (OUTPATIENT)
Age: 86
End: 2021-05-02

## 2021-05-27 ENCOUNTER — NON-APPOINTMENT (OUTPATIENT)
Age: 86
End: 2021-05-27

## 2021-05-28 ENCOUNTER — NON-APPOINTMENT (OUTPATIENT)
Age: 86
End: 2021-05-28

## 2021-05-28 ENCOUNTER — APPOINTMENT (OUTPATIENT)
Dept: CARDIOLOGY | Facility: CLINIC | Age: 86
End: 2021-05-28
Payer: MEDICARE

## 2021-05-28 VITALS
TEMPERATURE: 98.8 F | DIASTOLIC BLOOD PRESSURE: 80 MMHG | OXYGEN SATURATION: 97 % | HEART RATE: 87 BPM | WEIGHT: 220 LBS | SYSTOLIC BLOOD PRESSURE: 146 MMHG | BODY MASS INDEX: 37.56 KG/M2 | HEIGHT: 64 IN

## 2021-05-28 VITALS — SYSTOLIC BLOOD PRESSURE: 128 MMHG | DIASTOLIC BLOOD PRESSURE: 60 MMHG

## 2021-05-28 DIAGNOSIS — R94.31 ABNORMAL ELECTROCARDIOGRAM [ECG] [EKG]: ICD-10-CM

## 2021-05-28 DIAGNOSIS — N28.1 CYST OF KIDNEY, ACQUIRED: ICD-10-CM

## 2021-05-28 DIAGNOSIS — E55.9 VITAMIN D DEFICIENCY, UNSPECIFIED: ICD-10-CM

## 2021-05-28 DIAGNOSIS — T14.8XXA OTHER INJURY OF UNSPECIFIED BODY REGION, INITIAL ENCOUNTER: ICD-10-CM

## 2021-05-28 DIAGNOSIS — R22.2 LOCALIZED SWELLING, MASS AND LUMP, TRUNK: ICD-10-CM

## 2021-05-28 DIAGNOSIS — M54.9 DORSALGIA, UNSPECIFIED: ICD-10-CM

## 2021-05-28 DIAGNOSIS — R82.90 UNSPECIFIED ABNORMAL FINDINGS IN URINE: ICD-10-CM

## 2021-05-28 DIAGNOSIS — M79.606 PAIN IN LEG, UNSPECIFIED: ICD-10-CM

## 2021-05-28 DIAGNOSIS — R73.03 PREDIABETES.: ICD-10-CM

## 2021-05-28 DIAGNOSIS — E78.5 HYPERLIPIDEMIA, UNSPECIFIED: ICD-10-CM

## 2021-05-28 DIAGNOSIS — E03.9 HYPOTHYROIDISM, UNSPECIFIED: ICD-10-CM

## 2021-05-28 LAB
25(OH)D3 SERPL-MCNC: 50.8 NG/ML
ALBUMIN SERPL ELPH-MCNC: 4.1 G/DL
ALP BLD-CCNC: 76 U/L
ALT SERPL-CCNC: 14 U/L
ANION GAP SERPL CALC-SCNC: 8 MMOL/L
APPEARANCE: CLEAR
AST SERPL-CCNC: 20 U/L
BACTERIA UR CULT: ABNORMAL
BACTERIA: NEGATIVE
BASOPHILS # BLD AUTO: 0.01 K/UL
BASOPHILS NFR BLD AUTO: 0.2 %
BILIRUB DIRECT SERPL-MCNC: 0.2 MG/DL
BILIRUB INDIRECT SERPL-MCNC: 0.7 MG/DL
BILIRUB SERPL-MCNC: 0.9 MG/DL
BILIRUBIN URINE: NEGATIVE
BLOOD URINE: NEGATIVE
BUN SERPL-MCNC: 16 MG/DL
CALCIUM SERPL-MCNC: 9.8 MG/DL
CHLORIDE SERPL-SCNC: 104 MMOL/L
CHOLEST SERPL-MCNC: 233 MG/DL
CK SERPL-CCNC: 45 U/L
CO2 SERPL-SCNC: 27 MMOL/L
COLOR: YELLOW
CREAT SERPL-MCNC: 0.69 MG/DL
CREAT SPEC-SCNC: 94 MG/DL
EOSINOPHIL # BLD AUTO: 0.12 K/UL
EOSINOPHIL NFR BLD AUTO: 2.4 %
ESTIMATED AVERAGE GLUCOSE: 126 MG/DL
GLUCOSE QUALITATIVE U: NEGATIVE
GLUCOSE SERPL-MCNC: 128 MG/DL
HBA1C MFR BLD HPLC: 6 %
HCT VFR BLD CALC: 43.3 %
HDLC SERPL-MCNC: 85 MG/DL
HGB BLD-MCNC: 13.8 G/DL
HYALINE CASTS: 1 /LPF
IMM GRANULOCYTES NFR BLD AUTO: 0.2 %
KETONES URINE: NEGATIVE
LDLC SERPL CALC-MCNC: 116 MG/DL
LEUKOCYTE ESTERASE URINE: NEGATIVE
LYMPHOCYTES # BLD AUTO: 1.07 K/UL
LYMPHOCYTES NFR BLD AUTO: 21.6 %
MAN DIFF?: NORMAL
MCHC RBC-ENTMCNC: 31.2 PG
MCHC RBC-ENTMCNC: 31.9 GM/DL
MCV RBC AUTO: 97.7 FL
MICROALBUMIN 24H UR DL<=1MG/L-MCNC: <1.2 MG/DL
MICROALBUMIN/CREAT 24H UR-RTO: NORMAL MG/G
MICROSCOPIC-UA: NORMAL
MONOCYTES # BLD AUTO: 0.33 K/UL
MONOCYTES NFR BLD AUTO: 6.7 %
NEUTROPHILS # BLD AUTO: 3.41 K/UL
NEUTROPHILS NFR BLD AUTO: 68.9 %
NITRITE URINE: NEGATIVE
NONHDLC SERPL-MCNC: 148 MG/DL
PH URINE: 7
PLATELET # BLD AUTO: 163 K/UL
POTASSIUM SERPL-SCNC: 4.4 MMOL/L
PROT SERPL-MCNC: 6.6 G/DL
PROTEIN URINE: ABNORMAL
RBC # BLD: 4.43 M/UL
RBC # FLD: 13.1 %
RED BLOOD CELLS URINE: 2 /HPF
SODIUM SERPL-SCNC: 139 MMOL/L
SPECIFIC GRAVITY URINE: 1.02
SQUAMOUS EPITHELIAL CELLS: 1 /HPF
T4 FREE SERPL-MCNC: 1.2 NG/DL
TRIGL SERPL-MCNC: 158 MG/DL
TSH SERPL-ACNC: 2 UIU/ML
UROBILINOGEN URINE: NORMAL
WBC # FLD AUTO: 4.95 K/UL
WHITE BLOOD CELLS URINE: 1 /HPF

## 2021-05-28 PROCEDURE — 93000 ELECTROCARDIOGRAM COMPLETE: CPT

## 2021-05-28 PROCEDURE — 99214 OFFICE O/P EST MOD 30 MIN: CPT

## 2021-05-28 RX ORDER — METHYLPREDNISOLONE 4 MG/1
4 TABLET ORAL
Qty: 1 | Refills: 0 | Status: ACTIVE | COMMUNITY
Start: 2021-05-28 | End: 1900-01-01

## 2021-05-28 NOTE — REVIEW OF SYSTEMS
[Easy Bruising] : easy bruising [Negative] : Psychiatric [Easy Bleeding] : no easy bleeding [Swollen Glands] : no swollen glands [FreeTextEntry1] : Lump on back

## 2021-05-28 NOTE — PHYSICAL EXAM
[No Acute Distress] : no acute distress [Well Nourished] : well nourished [Well Developed] : well developed [Well-Appearing] : well-appearing [Normal Sclera/Conjunctiva] : normal sclera/conjunctiva [PERRL] : pupils equal round and reactive to light [EOMI] : extraocular movements intact [Normal Outer Ear/Nose] : the outer ears and nose were normal in appearance [Normal Oropharynx] : the oropharynx was normal [No JVD] : no jugular venous distention [No Lymphadenopathy] : no lymphadenopathy [Supple] : supple [Thyroid Normal, No Nodules] : the thyroid was normal and there were no nodules present [No Respiratory Distress] : no respiratory distress  [No Accessory Muscle Use] : no accessory muscle use [Clear to Auscultation] : lungs were clear to auscultation bilaterally [Normal Rate] : normal rate  [Regular Rhythm] : with a regular rhythm [Normal S1, S2] : normal S1 and S2 [No Murmur] : no murmur heard [No Carotid Bruits] : no carotid bruits [No Abdominal Bruit] : a ~M bruit was not heard ~T in the abdomen [No Varicosities] : no varicosities [Pedal Pulses Present] : the pedal pulses are present [No Edema] : there was no peripheral edema [No Palpable Aorta] : no palpable aorta [No Extremity Clubbing/Cyanosis] : no extremity clubbing/cyanosis [Soft] : abdomen soft [Non Tender] : non-tender [Non-distended] : non-distended [No Masses] : no abdominal mass palpated [No HSM] : no HSM [Normal Bowel Sounds] : normal bowel sounds [Normal Posterior Cervical Nodes] : no posterior cervical lymphadenopathy [Normal Anterior Cervical Nodes] : no anterior cervical lymphadenopathy [No CVA Tenderness] : no CVA  tenderness [No Spinal Tenderness] : no spinal tenderness [No Joint Swelling] : no joint swelling [Grossly Normal Strength/Tone] : grossly normal strength/tone [No Rash] : no rash [Coordination Grossly Intact] : coordination grossly intact [No Focal Deficits] : no focal deficits [Normal Gait] : normal gait [Deep Tendon Reflexes (DTR)] : deep tendon reflexes were 2+ and symmetric [Normal Affect] : the affect was normal [Normal Insight/Judgement] : insight and judgment were intact [de-identified] : reproducible pain back area and small area of bruising  [de-identified] : Brusing to the center of the spine  and small round skin lump present.

## 2021-05-28 NOTE — HISTORY OF PRESENT ILLNESS
[FreeTextEntry8] : This is an 86 year old lady with a PMH  of HLD, Pre-Dm, Hypercalcemia, Hypothyroidism, Chronic Back Pain and Vitamin D Deficiency presents today for an acute issue. Patient states that her son noticed some bruising to her lower back. Patient also reports a "bump" to her back. Patient also reports that her sciatic pain has worsened, she explains that the pain is shooting down her left leg. \par Patient denies worsening dyspnea, palpitations, chest pain, nausea, vomiting, dizziness and lightheadedness.\par

## 2021-05-31 LAB
APPEARANCE: CLEAR
APTT BLD: 35.3 SEC
BACTERIA UR CULT: ABNORMAL
BACTERIA: NEGATIVE
BASOPHILS # BLD AUTO: 0.03 K/UL
BASOPHILS NFR BLD AUTO: 0.4 %
BILIRUBIN URINE: NEGATIVE
BLOOD URINE: NEGATIVE
COLOR: NORMAL
EOSINOPHIL # BLD AUTO: 0.1 K/UL
EOSINOPHIL NFR BLD AUTO: 1.5 %
FIBRINOGEN PPP COAG.DERIVED-MCNC: 445 MG/DL
GLUCOSE QUALITATIVE U: NEGATIVE
HCT VFR BLD CALC: 44.9 %
HGB BLD-MCNC: 14.2 G/DL
HYALINE CASTS: 1 /LPF
IMM GRANULOCYTES NFR BLD AUTO: 0.3 %
INR PPP: 0.95 RATIO
KETONES URINE: NEGATIVE
LEUKOCYTE ESTERASE URINE: NEGATIVE
LYMPHOCYTES # BLD AUTO: 0.95 K/UL
LYMPHOCYTES NFR BLD AUTO: 13.9 %
MAN DIFF?: NORMAL
MCHC RBC-ENTMCNC: 30.4 PG
MCHC RBC-ENTMCNC: 31.6 GM/DL
MCV RBC AUTO: 96.1 FL
MICROSCOPIC-UA: NORMAL
MONOCYTES # BLD AUTO: 0.37 K/UL
MONOCYTES NFR BLD AUTO: 5.4 %
NEUTROPHILS # BLD AUTO: 5.35 K/UL
NEUTROPHILS NFR BLD AUTO: 78.5 %
NITRITE URINE: NEGATIVE
PH URINE: 7
PLATELET # BLD AUTO: 191 K/UL
PROTEIN URINE: NEGATIVE
PT BLD: 11.2 SEC
RBC # BLD: 4.67 M/UL
RBC # FLD: 13.8 %
RED BLOOD CELLS URINE: 2 /HPF
SPECIFIC GRAVITY URINE: 1.02
SQUAMOUS EPITHELIAL CELLS: 3 /HPF
UROBILINOGEN URINE: NORMAL
WBC # FLD AUTO: 6.82 K/UL
WHITE BLOOD CELLS URINE: 2 /HPF

## 2021-06-03 ENCOUNTER — NON-APPOINTMENT (OUTPATIENT)
Age: 86
End: 2021-06-03

## 2021-06-15 ENCOUNTER — APPOINTMENT (OUTPATIENT)
Dept: CARDIOLOGY | Facility: CLINIC | Age: 86
End: 2021-06-15

## 2021-07-23 ENCOUNTER — APPOINTMENT (OUTPATIENT)
Dept: CARDIOLOGY | Facility: CLINIC | Age: 86
End: 2021-07-23

## 2021-08-13 LAB — VWF:RCO ACT/NOR PPP PL AGG: 142 %

## 2021-09-16 ENCOUNTER — APPOINTMENT (OUTPATIENT)
Dept: ENDOCRINOLOGY | Facility: CLINIC | Age: 86
End: 2021-09-16